# Patient Record
Sex: FEMALE | Race: WHITE | NOT HISPANIC OR LATINO | Employment: FULL TIME | URBAN - METROPOLITAN AREA
[De-identification: names, ages, dates, MRNs, and addresses within clinical notes are randomized per-mention and may not be internally consistent; named-entity substitution may affect disease eponyms.]

---

## 2018-05-18 PROBLEM — I83.893 SYMPTOMATIC VARICOSE VEINS OF BOTH LOWER EXTREMITIES: Status: ACTIVE | Noted: 2018-05-18

## 2018-05-21 ENCOUNTER — CONSULT (OUTPATIENT)
Dept: VASCULAR SURGERY | Facility: CLINIC | Age: 38
End: 2018-05-21
Payer: COMMERCIAL

## 2018-05-21 VITALS
BODY MASS INDEX: 29.02 KG/M2 | SYSTOLIC BLOOD PRESSURE: 134 MMHG | WEIGHT: 170 LBS | DIASTOLIC BLOOD PRESSURE: 68 MMHG | TEMPERATURE: 98.4 F | HEIGHT: 64 IN | HEART RATE: 72 BPM | RESPIRATION RATE: 14 BRPM

## 2018-05-21 DIAGNOSIS — I83.893 SYMPTOMATIC VARICOSE VEINS OF BOTH LOWER EXTREMITIES: Primary | ICD-10-CM

## 2018-05-21 PROCEDURE — 99203 OFFICE O/P NEW LOW 30 MIN: CPT | Performed by: NURSE PRACTITIONER

## 2018-05-21 RX ORDER — ALPRAZOLAM 0.25 MG/1
0.25 TABLET ORAL AS NEEDED
COMMUNITY
Start: 2018-01-22

## 2018-05-21 RX ORDER — SUMATRIPTAN AND NAPROXEN SODIUM 85; 500 MG/1; MG/1
TABLET, FILM COATED ORAL
COMMUNITY
Start: 2018-01-22

## 2018-05-21 RX ORDER — LISINOPRIL AND HYDROCHLOROTHIAZIDE 20; 12.5 MG/1; MG/1
1 TABLET ORAL DAILY
COMMUNITY
Start: 2018-01-22 | End: 2019-02-11

## 2018-05-21 RX ORDER — BUPROPION HYDROCHLORIDE 150 MG/1
150 TABLET ORAL EVERY MORNING
COMMUNITY
Start: 2018-01-22

## 2018-05-21 RX ORDER — DIPHENOXYLATE HYDROCHLORIDE AND ATROPINE SULFATE 2.5; .025 MG/1; MG/1
1 TABLET ORAL
COMMUNITY
End: 2019-02-11

## 2018-05-21 NOTE — PATIENT INSTRUCTIONS
Varicose Veins   AMBULATORY CARE:   Varicose veins  are veins that become large, twisted, and swollen  They are common on the back of the calves, knees, and thighs  Varicose veins are caused by valves in your veins that do not work properly  This causes blood to collect and increase pressure in the veins of your legs  The increased pressure causes your veins to stretch, get larger, swell, and twist        Common symptoms include the following: Your symptoms may be worse after you stand or sit for long periods of time  You may have any of the following:  · Blue, purple, or bulging veins in your legs     · Pain, swelling, or muscle cramps in your legs    · Feeling of fatigue or heaviness in your legs    · Cramping in your legs  Seek care immediately if:   · You have a wound that does not heal or is infected  · You have an injury that has broken your skin and caused your varicose veins to bleed  · Your leg is swollen and hard  · You notice that your legs or feet are turning blue or black  · Your leg feels warm, tender, and painful  It may look swollen and red  Contact your healthcare provider if:   · You have pain in your leg that does not go away or gets worse  · You notice sudden large bruising on your legs  · You have a rash on your leg  · Your symptoms keep you from doing your daily activities  · You have questions or concerns about your condition or care  Treatment of varicose veins  aims to decrease symptoms, improve appearance, and prevent further problems  Treatment will depend on which veins are affected and how severe your condition is  You may need procedures to treat or remove your varicose veins  For example, your healthcare provider may inject a solution or use a laser to close the varicose veins  Surgery to remove long veins may also be done  Ask your healthcare provider for more information about procedures used to treat varicose veins    Manage varicose veins:   · Do not sit or stand for long periods of time  This can cause the blood to collect in your legs and make your symptoms worse  Bend or rotate your ankles several times every hour  Walk around for a few minutes every hour to get blood moving in your legs  · Do not cross your legs when you sit  This decreases blood flow to your feet and can make your symptoms worse  · Do not wear tight clothing or shoes  Do not wear high-heeled shoes  Do not wear clothes that are tight around the waist or knees  · Maintain a healthy weight  Being overweight or obese can make your varicose veins worse  Ask your healthcare provider how much you should weigh  Ask him or her to help you create a weight loss plan if you are overweight  · Wear pressure stockings as directed  The stockings are tight and put pressure on your legs  They improve blood flow and help prevent clots  · Elevate your legs  Keep them above the level of your heart for 15 to 30 minutes several times a day  You can also prop the end of your bed up slightly to elevate your legs while you sleep  This will help blood to flow back to your heart  · Get regular exercise  Talk to your healthcare provider about the best exercise plan for you  Exercise can improve blood flow to your legs and feet  Follow up with your healthcare provider as directed:  Write down your questions so you remember to ask them during your visits  © 2017 [employer] Information is for End User's use only and may not be sold, redistributed or otherwise used for commercial purposes  All illustrations and images included in CareNotes® are the copyrighted property of [employer]  or [employer]  The above information is an  only  It is not intended as medical advice for individual conditions or treatments  Talk to your doctor, nurse or pharmacist before following any medical regimen to see if it is safe and effective for you

## 2018-05-21 NOTE — PROGRESS NOTES
Varicose Veins  Assessment/Plan    Discussion Summary  63-year-old female who presents for evaluation of varicose veins  She has bilateral lower extremity heaviness/throbbing/aching in both lower extremities with left lower extremity swelling inhibiting her daily activities  Discomfort is primarily to the left medial calf varicosity  She is a  nad stand on her feet for a prolonged period of time   -We discussed the physiology of venous disease, treatment options and indications for treatment   -Rx 20-30mmHg compression given  She will get size and fitted begin to wear daily   -Will evaluate with venous reflux study in 3 months   -Additionally she should periodically elevate her legs, exercise routinely, maintain a normal weight and use moisturizers daily to maintain skin integrity  -follow-up in the office in 3 months to review reflux study and discuss treatment options        Chief Complaint: "I have painful veins in my legs "    Patient is new to our practice and was referred by Dr Justina Trimble (PCP)  She has not had any testing  She complains of bulging painful veins in her legs for the past 10 years  She states that her legs swell and feel tired and heavy the longer she is standing on them  Left leg is worse than right leg  She does wear OTC compression stockings and elevates her legs          History of Present Illness   Juana Barnett is seen for evaluation of: [x]Varicose veins/legs  []Spider veins/legs  []Spider veins/face  []Venous stasis ulcer  []Superficial thrombophlebitis  []Other -      She complains of []none  [x]bulging veins  []dilated veins  []discolored veins         There is []no edema              []right leg edema  [x]left leg edema       []bilateral lower extremity edema     There is   []no leg pain          []right leg pain  []left leg pain         []bilateral leg pain  [x]bilateral leg pain; L>R   []bilateral leg pain; R>L     Pain is described as [x]aching []itching  []sharp                []burning  [x]throbbing         []stinging  [x]heavy                []dull  []other -      Symptoms have been ongoing for:  Ten years and progressively worsening over the past few years   There is  [x]no pertinent medical history  []history of DVT  []PE  []superficial venous thrombosis     Prior treatment includes []none  []EVLT  [x]OTC stockings  []prescription compression stockings  []vein ligation  []vein stripping  []stab phlebectomy  []sclerotherapy injections  []Other -      Current treatment includes []none  [x]compression socks  []avoiding tight clothing  [x]leg elevation  [x]rest  []exercise  []weight management  []skin care  []periodic evaluation   []Other-     Treatment has been []effective  [x]ineffective     55-year-old female presents for evaluation of bilateral lower extremity varicosities  She has had bilateral lower extremity varicose veins have been present since the age of 32 and worsened after pregnancy  She has had 2 pregnancies, children ages 11 in 8  She wore prescription compression stockings while pregnant  She currently wears over-the-counter compression  She complains of heaviness, aching, throbbing, discomfort in both legs on a daily basis, left being worse than the right  Specifically the left medial calf varicosity is painful  She has varicosities to anterior lower leg bilaterally  She notices swelling in her left ankle toward end of the day  Symptoms are worse toward the end of the day  Symptoms worse with menstrual cycle  The over-the-counter compression does help her symptoms though not completely controlled them  She is a  and stands for long hours on a regular basis  She has a family history of maternal spider veins  She denies any history of deeper superficial venous thrombosis  She did have a venous ultrasound in 2012 which was negative for DVT  Review of Systems   Constitutional: Negative      HENT: Positive for postnasal drip  Negative for congestion, facial swelling and trouble swallowing  Eyes: Negative  Respiratory: Negative  Cardiovascular: Positive for leg swelling  Negative for chest pain and palpitations  Painful veins   Gastrointestinal: Negative  Endocrine: Negative  Genitourinary: Negative  Musculoskeletal: Positive for back pain and neck pain  Negative for arthralgias  Leg pain   Skin: Negative for color change, rash and wound  Allergic/Immunologic: Negative  Neurological: Positive for headaches  Negative for dizziness, weakness and numbness  Hematological: Negative  Psychiatric/Behavioral: Negative for agitation, decreased concentration, self-injury and suicidal ideas  The patient is nervous/anxious  Depression          Objective   Physical Exam   General:  Alert oriented x3  Neurological: grossly intact  Heart:  Regular rate and rhythm  Lungs:  Clear to auscultation bilaterally  Abdomen: is soft, nontender, positive bowel sounds  Extremities:  Left medial calf truncal varicosity with mild overlying hyperpigmentation and bilateral distal anterior leg truncal varicosities  Spider telangiectasias of left distal lower leg  Trace left ankle edema  Pulses:  2+ fem/pop/DP pulses bilaterally      Current Outpatient Prescriptions   Medication Sig Dispense Refill    ALPRAZolam (XANAX) 0 25 mg tablet Take 0 25 mg by mouth Three times a day      buPROPion (WELLBUTRIN XL) 150 mg 24 hr tablet Take 150 mg by mouth      Cholecalciferol 5000 units TABS Take 5,000 Units by mouth      lisinopril-hydrochlorothiazide (PRINZIDE,ZESTORETIC) 20-12 5 MG per tablet Take 1 tablet by mouth      SUMAtriptan-naproxen (TREXIMET)  MG per tablet Take 1 tablet by mouth once as needed at start of headache  May repeat after >2 hours   Do not exceed 2 tablets in 24 hours      multivitamin (THERAGRAN) TABS Take 1 tablet by mouth       No current facility-administered medications for this visit  Allergies   Allergen Reactions    Cefaclor Hives      No past medical history on file  No past surgical history on file  Social History     Social History    Marital status: /Civil Union     Spouse name: N/A    Number of children: N/A    Years of education: N/A     Occupational History    Not on file  Social History Main Topics    Smoking status: Not on file    Smokeless tobacco: Not on file    Alcohol use Not on file    Drug use: Unknown    Sexual activity: Not on file     Other Topics Concern    Not on file     Social History Narrative    No narrative on file      No family history on file

## 2018-09-24 ENCOUNTER — HOSPITAL ENCOUNTER (OUTPATIENT)
Dept: NON INVASIVE DIAGNOSTICS | Facility: HOSPITAL | Age: 38
Discharge: HOME | End: 2018-09-24
Payer: COMMERCIAL

## 2018-09-24 DIAGNOSIS — I83.893 SYMPTOMATIC VARICOSE VEINS OF BOTH LOWER EXTREMITIES: ICD-10-CM

## 2018-09-24 PROCEDURE — 93970 EXTREMITY STUDY: CPT

## 2018-09-25 PROCEDURE — 93970 EXTREMITY STUDY: CPT | Performed by: SURGERY

## 2018-10-29 ENCOUNTER — OFFICE VISIT (OUTPATIENT)
Dept: VASCULAR SURGERY | Facility: CLINIC | Age: 38
End: 2018-10-29
Payer: COMMERCIAL

## 2018-10-29 VITALS
SYSTOLIC BLOOD PRESSURE: 142 MMHG | TEMPERATURE: 98.5 F | HEART RATE: 74 BPM | RESPIRATION RATE: 16 BRPM | WEIGHT: 171 LBS | BODY MASS INDEX: 28.49 KG/M2 | HEIGHT: 65 IN | DIASTOLIC BLOOD PRESSURE: 72 MMHG

## 2018-10-29 DIAGNOSIS — I87.2 VENOUS INSUFFICIENCY OF BOTH LOWER EXTREMITIES: Primary | ICD-10-CM

## 2018-10-29 DIAGNOSIS — I83.893 SYMPTOMATIC VARICOSE VEINS OF BOTH LOWER EXTREMITIES: ICD-10-CM

## 2018-10-29 PROBLEM — E78.5 HYPERLIPIDEMIA: Status: ACTIVE | Noted: 2017-01-10

## 2018-10-29 PROCEDURE — 99215 OFFICE O/P EST HI 40 MIN: CPT | Performed by: SURGERY

## 2018-10-29 RX ORDER — CLINDAMYCIN PHOSPHATE 900 MG/50ML
900 INJECTION INTRAVENOUS ONCE
Status: CANCELLED | OUTPATIENT
Start: 2018-10-29 | End: 2018-10-29

## 2018-10-29 NOTE — PROGRESS NOTES
Assessment/Plan:    Pt is a 44 yo F w/ hx of HTN, HLD, OCD< migraine, anxiety, MDD, presents to discuss venous disease  Venous insufficiency of both lower extremities  Symptomatic varicose veins of both lower extremities  -     Case request operating room: Left greater saphenous vein EVLT with stab phlebectomies; Standing  -     Basic metabolic panel; Future  -     CBC and Platelet; Future  -     Case request operating room: Left greater saphenous vein EVLT with stab phlebectomies  -reviewed LEVDR which shows B GSV reflux; R from ing through mid thigh and left at inguinal, mid thigh, and mid calf levels; reflux more significant on LLE; SSV and deep system are competent B  -discussed the pathophysiology of venous disease and indications for treatment; her disease is in the ideal location and matches her symptomatology; despite optimal medical management, she continues to have symptoms which are affecting her at her job and at home taking care of her kids; discussed options including continued medical management versus EVLT and stabs and patient would like to proceed with surgical intervention  -plan for Left greater saphenous vein EVLT with stab phlebectomies  -may require R side at interval treatment but will decide after first side complete  -labs only; does have HTN since pregancy but well controlled; old holter from [facility] was wnl    Other orders  -     Diet NPO; Sips with meds; Standing  -     Void on call to OR; Standing  -     Insert peripheral IV;  Standing  -     Shower/scrub; Standing  -     clindamycin (CLEOCIN) IVPB (premix) 900 mg; Infuse 50 mL (900 mg total) into a venous catheter once       Operative Scheduling Information:    Hospital:  [facility]    Physician:  Me    Surgery: Left greater saphenous vein EVLT with stab phlebectomies      Urgency:  Standard    Case Length:  Normal    Post-op Bed:  Outpatient    OR Table:  Standard    Equipment Needs:  Vascular technologist    Medication Instructions:  None    Hydration:  No      Subjective:      Patient ID: Judith Thomas is a 45 y o  female  Patient had Lin Pappas on 9/24/2018  Patient has pain in VIPUL legs L>R  She complains of tired heavy legs that 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  She has Swelling VIPUL legs L>R especially during her period  She has worn compression socks for many years  She elevates her legs as she can and exercises when she can  She has a history of HTN      HPI:    Patient presents to discuss venous disease  Patient complains of mild edema, moderate heaviness, achiness of the legs, worse at the end of the day  She also has more severe pain at the site of a large varicosity in her left leg  She has hx of 2 pregnancies (most recent 5 yrs ago) and symptoms have worsened since then  She also notes some varicosities in her groin/vaginal region and some pain with intercourse  All of her symptoms are worse during menses  Denies hx of DVT  Family hx of venous disease in mother  She has been wearing OTC compression for many years  Had some Rx compression that hadn't been worn from after her preganancy and has been wearing these ~5d/wk since last visit with [facility]  She elevates her legs during sleep and just before sleep when watching TV  She is active with her kids but doesn't exercise per se  She is trying to lose weight but goes up and down frequently  Conservative measures help her symptoms but she still has significant swelling and aching and pain at the end of the day  She works as a  and is on her feet for 8-12 hrs/day  The following portions of the patient's history were reviewed and updated as appropriate: allergies, current medications, past family history, past medical history, past social history, past surgical history and problem list     Review of Systems   Constitutional: Negative  HENT: Negative  Eyes: Negative  Respiratory: Negative      Cardiovascular: Positive for leg swelling (painful varicosities, mostly L medial around knee)  Gastrointestinal: Negative  Endocrine: Negative  Genitourinary: Negative  Musculoskeletal: Negative  Negative for gait problem  Skin: Positive for color change  Negative for wound  Allergic/Immunologic: Negative  Neurological: Negative  Hematological: Negative  Psychiatric/Behavioral: Negative  Objective:      /72 (BP Location: Right arm, Patient Position: Sitting)   Pulse 74   Temp 98 5 °F (36 9 °C)   Resp 16   Ht 5' 4 75" (1 645 m)   Wt 77 6 kg (171 lb)   BMI 28 68 kg/m²          Physical Exam   Constitutional: She is oriented to person, place, and time  She appears well-developed and well-nourished  HENT:   Head: Normocephalic and atraumatic  Eyes: Conjunctivae are normal    Neck: Normal range of motion  Neck supple  Cardiovascular: Normal rate, regular rhythm and normal heart sounds  No murmur heard  Pulses:       Radial pulses are 2+ on the right side, and 2+ on the left side  Dorsalis pedis pulses are 2+ on the right side, and 2+ on the left side  Posterior tibial pulses are 2+ on the right side, and 2+ on the left side  Pulmonary/Chest: Effort normal and breath sounds normal  No respiratory distress  She has no wheezes  Abdominal: Soft  She exhibits no distension  There is no tenderness  There is no rebound  Musculoskeletal: Normal range of motion  She exhibits edema (mild edema BLE)  Neurological: She is alert and oriented to person, place, and time  Skin: Skin is warm and dry  Large ropy varicosities; L lower leg from just above knee to mid leg running medial to anterior and RLE on the anterior surface  Mild skin changes of L ankle/lower leg with darkening  No open wounds   Psychiatric: She has a normal mood and affect  Her behavior is normal    Nursing note and vitals reviewed          Vitals:    10/29/18 1128   BP: 142/72   BP Location: Right arm   Patient Position: Sitting   Pulse: 74   Resp: 16   Temp: 98 5 °F (36 9 °C)   Weight: 77 6 kg (171 lb)   Height: 5' 4 75" (1 645 m)       Patient Active Problem List   Diagnosis    Symptomatic varicose veins of both lower extremities    Anxiety    Depression    Hyperlipidemia    Hypertension, essential    Migraine    Obsessive-compulsive disorder    Venous insufficiency of both lower extremities       Past Surgical History:   Procedure Laterality Date    MYRINGOTOMY W/ TUBES      UMBILICAL HERNIA REPAIR      WISDOM TOOTH EXTRACTION         Family History   Problem Relation Age of Onset    Stroke Father     Heart attack Father        Social History     Social History    Marital status: /Civil Union     Spouse name: N/A    Number of children: N/A    Years of education: N/A     Occupational History    Not on file  Social History Main Topics    Smoking status: Former Smoker     Packs/day: 0 25     Years: 10 00     Types: Cigarettes     Quit date: 2006    Smokeless tobacco: Never Used    Alcohol use Yes      Comment: social    Drug use: No    Sexual activity: Yes     Other Topics Concern    Not on file     Social History Narrative    No narrative on file       Allergies   Allergen Reactions    Cefaclor Hives         Current Outpatient Prescriptions:     ALPRAZolam (XANAX) 0 25 mg tablet, Take 0 25 mg by mouth Three times a day, Disp: , Rfl:     buPROPion (WELLBUTRIN XL) 150 mg 24 hr tablet, Take 150 mg by mouth, Disp: , Rfl:     Cholecalciferol 5000 units TABS, Take 5,000 Units by mouth, Disp: , Rfl:     lisinopril-hydrochlorothiazide (PRINZIDE,ZESTORETIC) 20-12 5 MG per tablet, Take 1 tablet by mouth, Disp: , Rfl:     multivitamin (THERAGRAN) TABS, Take 1 tablet by mouth, Disp: , Rfl:     SUMAtriptan-naproxen (TREXIMET)  MG per tablet, Take 1 tablet by mouth once as needed at start of headache  May repeat after >2 hours   Do not exceed 2 tablets in 24 hours, Disp: , Rfl:

## 2018-10-29 NOTE — PATIENT INSTRUCTIONS
1) Venous insufficiency  -you have leaky valves in some of the veins in your legs  -we will continue your medical management with daily compression use, leg elevation, increased activity, and weight loss  -we also discussed surgery to treat the veins including EVLT (laser ablation) and stab phlebectomies (removing the varicose veins)  -if you would like to buy compression online, look for "gradient compression" in a weight of 20-30mmHg; try either Sigvaris or Jobst which are dependable brands  There are many different websites you can find these on and generally one is having a sale    Venous Insufficiency   WHAT YOU NEED TO KNOW:   What is venous insufficiency? Venous insufficiency is a condition that prevents blood from flowing out of your legs and back to your heart  Veins contain valves that help blood flow in one direction  Venous insufficiency means the valves do not close correctly or fully  Blood flows back and pools in your leg  This can cause problems such as varicose veins  Venous insufficiency may also be called chronic venous insufficiency or venous stasis  What increases my risk for venous insufficiency? · A leg injury or blood clot    · Being a woman    · Pregnancy    · Older age    · A family history of varicose veins    · Smoking cigarettes    · Obesity, or not getting enough exercise  What are the signs and symptoms of venous insufficiency? · Visible veins on your legs that may be small and red or large, thick, and blue    · Swelling in your ankles or calves    · Changes in skin color, such as dark or purple skin    · An ulcer (open sore) on your leg    · Leg pain that is worse when you are menstruating (women) or when you stand, and better when you elevate your legs    · Burning or itching    · Cramps that happen at night    · Thick, hard skin on your legs and ankles    · Feeling of heaviness in your legs  How is venous insufficiency diagnosed?    · Venous duplex imaging  is a procedure used to examine the blood flow through veins  A gel will be applied to your legs  Your healthcare provider will slide a small device called a transducer across the veins  The transducer is a microphone that helps your healthcare provider hear blood moving through the vein  · Contrast venography  is a procedure used to show the veins on x-ray pictures  A catheter is guided into the vein  Contrast liquid is injected into the catheter to help the veins show up better in the pictures  Tell the healthcare provider if you have ever had an allergic reaction to contrast liquid  · Plethysmography  is a procedure that may be used to find changes in blood pressure through your veins  You will wear a blood pressure cuff on your leg  Changes in pressure and the amount of blood that can circulate through your leg veins are measured  Pressures are measured while you stand, sit, and lift your leg  How is venous insufficiency treated? · Medicine  may be given to improve blood flow  The medicines may thin your blood or reduce swelling to help blood flow  You may also need medicine to treat a bacterial infection  · Ablation  is a procedure used to close varicose veins  A catheter is guided until it is near the vein  A device will then be guided to the area  The device may produce energy through radiofrequency or a laser  The energy creates heat that will close the blood vessel  · Sclerotherapy  is a procedure used to fade visible veins  Your healthcare provider will inject a liquid into a spider vein or varicose vein  The liquid causes irritation in the vein  The vein swells and sticks together  Your body will then absorb the vein  · Surgery  may be needed if other treatments do not work  Surgery may be used to repair a leg vein valve or to clip or tie off a vein so blood cannot flow through it  You may need to have a veins removed during surgery called stripping   Surgery may be used to bypass (go around) the damaged vein  Blood will flow through a vein transplanted from another part of your body  What can I do to manage my symptoms? · Wear pressure stockings as directed  Pressure stockings help keep blood from pooling in your leg veins  Your healthcare provider can prescribe stockings that are right for you  Do not buy over-the-counter pressure stockings unless your healthcare provider says it is okay  They may not fit correctly or may have elastic that cuts off your circulation  Ask your healthcare provider when to start wearing pressure stockings and how long to wear them each day  · Do not sit or stand for long periods of time  If you have to sit for a long time, flex and extend your legs, feet, and ankles  Do this about 10 times every 30 minutes to help keep blood flowing  If you have to stand for a long time, take breaks and sit with your legs elevated  · Elevate your legs  Elevate your legs above the level of your heart to reduce swelling  Your healthcare provider may recommend that you keep your legs elevated for 30 minutes at a time  You may need to do this 3 to 4 times per day, or more if your healthcare provider recommends  · Do not smoke  Nicotine and other chemicals in cigarettes and cigars can cause blood vessel damage  Ask your healthcare provider for information if you currently smoke and need help to quit  E-cigarettes or smokeless tobacco still contain nicotine  Talk to your healthcare provider before you use these products  · Reach or maintain a healthy weight  Extra weight can make venous insufficiency worse  Ask your healthcare provider how much you should weigh  He can help you create a weight loss plan if you need to lose weight  · Exercise as directed  Walking can help increase blood flow in your calves  Ask your healthcare provider how much exercise you need each day and which exercises are best for you  · Care for your skin  Keep your skin clean   Do not use any soaps or lotions that may dry your skin  For example, do not use products that contain fragrance or alcohol  If you have a skin ulcer, your healthcare provider may recommend a wet-to-dry bandage  To do this, apply a wet bandage to your wound and allow it to dry  This will help remove drainage from your wound each time you change the bandage  Your healthcare provider will tell you how often to change your bandage and which kind of bandage to use  Check your wound for signs of infection, such as swelling or pus  · Go to physical therapy (PT) as directed  A physical therapist can help you increase movement and range of motion in your legs  When should I seek immediate care? · You have a wound that does not heal or is infected  · You have an injury that has broken your skin and caused your varicose veins to bleed  · Your leg is swollen and hard  · You have pain in your leg that does not go away or gets worse  · Your legs or feet are turning blue or black  · Your leg feels warm, tender, and painful  It may look swollen and red  When should I contact my healthcare provider? · You have a fever  · You have varicose veins and they are painful  · You have new or worsening leg pain, swelling, or redness  · You have new or worsening ulcers or other sores on your leg  · You have questions or concerns about your condition or care  CARE AGREEMENT:   You have the right to help plan your care  Learn about your health condition and how it may be treated  Discuss treatment options with your caregivers to decide what care you want to receive  You always have the right to refuse treatment  The above information is an  only  It is not intended as medical advice for individual conditions or treatments  Talk to your doctor, nurse or pharmacist before following any medical regimen to see if it is safe and effective for you    © 2017 [employer] Information is for End User's use only and may not be sold, redistributed or otherwise used for commercial purposes  All illustrations and images included in CareNotes® are the copyrighted property of A D [employer] M [employer]  or [employer]

## 2018-11-16 ENCOUNTER — TELEPHONE (OUTPATIENT)
Dept: VASCULAR SURGERY | Facility: CLINIC | Age: 38
End: 2018-11-16

## 2018-12-26 ENCOUNTER — TELEPHONE (OUTPATIENT)
Dept: VASCULAR SURGERY | Facility: CLINIC | Age: 38
End: 2018-12-26

## 2019-01-07 ENCOUNTER — TELEPHONE (OUTPATIENT)
Dept: VASCULAR SURGERY | Facility: CLINIC | Age: 39
End: 2019-01-07

## 2019-01-07 DIAGNOSIS — I83.893 SYMPTOMATIC VARICOSE VEINS OF BOTH LOWER EXTREMITIES: Primary | ICD-10-CM

## 2019-01-07 NOTE — TELEPHONE ENCOUNTER
Spoke to patient to schedule surgery for 2-8-19 at [facility] with LMD Patient was told to get blood work done before surgery patient was told nothing to eat or drank after midnight on 2-7-19 No medications to be held I am mailing H&P to patient to have her primary doctor to complete and faxed back to us  Patient confirmed and understood instructions   LLF

## 2019-01-21 ENCOUNTER — APPOINTMENT (OUTPATIENT)
Dept: LAB | Facility: CLINIC | Age: 39
End: 2019-01-21
Payer: COMMERCIAL

## 2019-01-21 DIAGNOSIS — I87.2 VENOUS INSUFFICIENCY OF BOTH LOWER EXTREMITIES: ICD-10-CM

## 2019-01-21 DIAGNOSIS — I83.893 SYMPTOMATIC VARICOSE VEINS OF BOTH LOWER EXTREMITIES: ICD-10-CM

## 2019-01-21 LAB
ANION GAP SERPL CALCULATED.3IONS-SCNC: 5 MMOL/L (ref 4–13)
BUN SERPL-MCNC: 10 MG/DL (ref 5–25)
CALCIUM SERPL-MCNC: 8.7 MG/DL (ref 8.3–10.1)
CHLORIDE SERPL-SCNC: 106 MMOL/L (ref 100–108)
CO2 SERPL-SCNC: 24 MMOL/L (ref 21–32)
CREAT SERPL-MCNC: 0.85 MG/DL (ref 0.6–1.3)
ERYTHROCYTE [DISTWIDTH] IN BLOOD BY AUTOMATED COUNT: 12.8 % (ref 11.6–15.1)
GFR SERPL CREATININE-BSD FRML MDRD: 87 ML/MIN/1.73SQ M
GLUCOSE SERPL-MCNC: 97 MG/DL (ref 65–140)
HCT VFR BLD AUTO: 42.7 % (ref 34.8–46.1)
HGB BLD-MCNC: 14.1 G/DL (ref 11.5–15.4)
MCH RBC QN AUTO: 30.9 PG (ref 26.8–34.3)
MCHC RBC AUTO-ENTMCNC: 33 G/DL (ref 31.4–37.4)
MCV RBC AUTO: 94 FL (ref 82–98)
PLATELET # BLD AUTO: 276 THOUSANDS/UL (ref 149–390)
PMV BLD AUTO: 11.3 FL (ref 8.9–12.7)
POTASSIUM SERPL-SCNC: 4.6 MMOL/L (ref 3.5–5.3)
RBC # BLD AUTO: 4.56 MILLION/UL (ref 3.81–5.12)
SODIUM SERPL-SCNC: 135 MMOL/L (ref 136–145)
WBC # BLD AUTO: 5.2 THOUSAND/UL (ref 4.31–10.16)

## 2019-01-21 PROCEDURE — 85027 COMPLETE CBC AUTOMATED: CPT

## 2019-01-21 PROCEDURE — 36415 COLL VENOUS BLD VENIPUNCTURE: CPT

## 2019-01-21 PROCEDURE — 80048 BASIC METABOLIC PNL TOTAL CA: CPT

## 2019-01-24 ENCOUNTER — ANESTHESIA EVENT (OUTPATIENT)
Dept: PERIOP | Facility: HOSPITAL | Age: 39
End: 2019-01-24
Payer: COMMERCIAL

## 2019-01-30 NOTE — PRE-PROCEDURE INSTRUCTIONS
Pre-Surgery Instructions:   Medication Instructions    ALPRAZolam (XANAX) 0 25 mg tablet Instructed patient per Anesthesia Guidelines   buPROPion (WELLBUTRIN XL) 150 mg 24 hr tablet Instructed patient per Anesthesia Guidelines   Cholecalciferol 5000 units TABS Patient was instructed by Physician and understands   lisinopril-hydrochlorothiazide (PRINZIDE,ZESTORETIC) 20-12 5 MG per tablet Instructed patient per Anesthesia Guidelines   multivitamin [facility]) TABS Patient was instructed by Physician and understands   SUMAtriptan-naproxen (TREXIMET)  MG per tablet Patient was instructed by Physician and understands  Pre op and bathing instructions reviewed   Pt has hibiclens

## 2019-01-31 ENCOUNTER — TELEPHONE (OUTPATIENT)
Dept: VASCULAR SURGERY | Facility: CLINIC | Age: 39
End: 2019-01-31

## 2019-01-31 NOTE — TELEPHONE ENCOUNTER
Due to a change in Dr Delong Ache scheduled we rescheduled pts EVLT surgery to 2/12/19 at [facility]  Pt is okay with this date and understand all instructions

## 2019-02-05 NOTE — PRE-PROCEDURE INSTRUCTIONS
Pre-Surgery Instructions:   Medication Instructions    ALPRAZolam (XANAX) 0 25 mg tablet epic    buPROPion (WELLBUTRIN XL) 150 mg 24 hr tablet epic    Cholecalciferol 5000 units TABS Instructed patient per Anesthesia Guidelines   lisinopril-hydrochlorothiazide (PRINZIDE,ZESTORETIC) 20-12 5 MG per tablet epic do not take dos    multivitamin (Joshua Ortega) TABS Instructed patient per Anesthesia Guidelines   SUMAtriptan-naproxen (TREXIMET)  MG per tablet Instructed patient per Anesthesia Guidelines  No NSAIDS 7 days prior to procedure  Education Index     Med Instructions Troubleshoot   Antidepressant Med Class     Continue to take this medication on your normal schedule  If this is an oral medication and you take it in the morning, then you may take this medicine with a sip of water  Benzodiazepine antagonist Med Class     If this medication is needed please continue to take on your normal schedule  If you take it in the morning, then you may take this medicine with a sip of water  Pre procedure instructions reviewed,verbalizes understanding  Advised not to smoke prior to procedure

## 2019-02-08 ENCOUNTER — ANESTHESIA (OUTPATIENT)
Dept: PERIOP | Facility: HOSPITAL | Age: 39
End: 2019-02-08
Payer: COMMERCIAL

## 2019-02-11 ENCOUNTER — HOSPITAL ENCOUNTER (EMERGENCY)
Facility: HOSPITAL | Age: 39
Discharge: HOME | End: 2019-02-11
Attending: EMERGENCY MEDICINE | Admitting: EMERGENCY MEDICINE
Payer: COMMERCIAL

## 2019-02-11 VITALS
RESPIRATION RATE: 18 BRPM | OXYGEN SATURATION: 98 % | BODY MASS INDEX: 30.76 KG/M2 | TEMPERATURE: 99 F | DIASTOLIC BLOOD PRESSURE: 77 MMHG | SYSTOLIC BLOOD PRESSURE: 124 MMHG | WEIGHT: 182 LBS | HEART RATE: 91 BPM

## 2019-02-11 DIAGNOSIS — G43.909 MIGRAINE: Primary | ICD-10-CM

## 2019-02-11 LAB — EXT PREG TEST URINE: NEGATIVE

## 2019-02-11 PROCEDURE — 99283 EMERGENCY DEPT VISIT LOW MDM: CPT

## 2019-02-11 PROCEDURE — 81025 URINE PREGNANCY TEST: CPT | Performed by: EMERGENCY MEDICINE

## 2019-02-11 PROCEDURE — 96361 HYDRATE IV INFUSION ADD-ON: CPT

## 2019-02-11 PROCEDURE — 96374 THER/PROPH/DIAG INJ IV PUSH: CPT

## 2019-02-11 PROCEDURE — 96375 TX/PRO/DX INJ NEW DRUG ADDON: CPT

## 2019-02-11 RX ORDER — BUTALBITAL, ACETAMINOPHEN AND CAFFEINE 50; 325; 40 MG/1; MG/1; MG/1
1 TABLET ORAL EVERY 4 HOURS PRN
Qty: 2 TABLET | Refills: 0 | Status: SHIPPED | OUTPATIENT
Start: 2019-02-11

## 2019-02-11 RX ORDER — METOCLOPRAMIDE HYDROCHLORIDE 5 MG/ML
10 INJECTION INTRAMUSCULAR; INTRAVENOUS ONCE
Status: COMPLETED | OUTPATIENT
Start: 2019-02-11 | End: 2019-02-11

## 2019-02-11 RX ORDER — DIPHENHYDRAMINE HYDROCHLORIDE 50 MG/ML
50 INJECTION INTRAMUSCULAR; INTRAVENOUS ONCE
Status: COMPLETED | OUTPATIENT
Start: 2019-02-11 | End: 2019-02-11

## 2019-02-11 RX ORDER — BUTALBITAL, ACETAMINOPHEN AND CAFFEINE 50; 325; 40 MG/1; MG/1; MG/1
1 TABLET ORAL ONCE
Status: COMPLETED | OUTPATIENT
Start: 2019-02-11 | End: 2019-02-11

## 2019-02-11 RX ADMIN — METOCLOPRAMIDE 10 MG: 5 INJECTION, SOLUTION INTRAMUSCULAR; INTRAVENOUS at 12:10

## 2019-02-11 RX ADMIN — BUTALBITAL, ACETAMINOPHEN, AND CAFFEINE 1 TABLET: 50; 325; 40 TABLET ORAL at 12:08

## 2019-02-11 RX ADMIN — DIPHENHYDRAMINE HYDROCHLORIDE 50 MG: 50 INJECTION INTRAMUSCULAR; INTRAVENOUS at 12:08

## 2019-02-11 RX ADMIN — SODIUM CHLORIDE 500 ML: 0.9 INJECTION, SOLUTION INTRAVENOUS at 12:08

## 2019-02-11 NOTE — H&P (VIEW-ONLY)
History  Chief Complaint   Patient presents with    Migraine     Pt reports migraine since saturday  Pt reports not able to take home medications due to needing a vascular surgery tomorrow  per pt tylenol is giving her no relief  Pt states the pain is on the right side of her head and feels as if it it squeezing her eye  44 yo female with h/o migraine, for which she is prescribed abortive medication, and responds well to NSAIDs, c/o onset of HA over the last 2 days, without fever, chills, or any particularly different components, only that she is scheduled for vascular surgery tomorrow and was advised not to use her migraine medication or any NSAIDs due to bleeding risk  She has been trying tylenol without much help  She said usually toradol works well if she needs to seek care in an urgent care, but she knows that is also an NSAID  History provided by:  Patient  Headache   Pain location:  Generalized  Quality:  Dull  Onset quality:  Gradual  Duration:  2 days  Timing:  Constant  Progression:  Waxing and waning  Chronicity:  Recurrent  Similar to prior headaches: yes    Context: bright light and loud noise    Relieved by:  Nothing  Worsened by:  Light  Ineffective treatments:  Acetaminophen  Associated symptoms: nausea and photophobia    Associated symptoms: no abdominal pain, no cough, no diarrhea, no dizziness, no fever, no neck pain, no neck stiffness, no numbness, no sore throat, no vomiting and no weakness        Prior to Admission Medications   Prescriptions Last Dose Informant Patient Reported? Taking? ALPRAZolam (XANAX) 0 25 mg tablet More than a month at Unknown time Self Yes No   Sig: Take 0 25 mg by mouth as needed     Cholecalciferol 5000 units TABS  Self Yes Yes   Sig: Take 5,000 Units by mouth   SUMAtriptan-naproxen (TREXIMET)  MG per tablet  Self Yes Yes   Sig: Take 1 tablet by mouth once as needed at start of headache  May repeat after >2 hours   Do not exceed 2 tablets in 24 hours   buPROPion (WELLBUTRIN XL) 150 mg 24 hr tablet  Self Yes Yes   Sig: Take 150 mg by mouth every morning     lisinopril-hydrochlorothiazide (PRINZIDE,ZESTORETIC) 20-12 5 MG per tablet  Self Yes Yes   Sig: Take 1 tablet by mouth daily        Facility-Administered Medications: None       Past Medical History:   Diagnosis Date    Anxiety     OCD    GERD (gastroesophageal reflux disease)     HTN (hypertension)     Hyperlipidemia     Migraines     Palpitations     Post partum depression        Past Surgical History:   Procedure Laterality Date    MYRINGOTOMY W/ TUBES      UMBILICAL HERNIA REPAIR      WISDOM TOOTH EXTRACTION         Family History   Problem Relation Age of Onset    Stroke Father     Heart attack Father      I have reviewed and agree with the history as documented  Social History     Tobacco Use    Smoking status: Former Smoker     Packs/day: 0 25     Years: 10 00     Pack years: 2 50     Types: Cigarettes     Last attempt to quit:      Years since quittin 1    Smokeless tobacco: Never Used    Tobacco comment: advised not to smoke prior to procedure   Substance Use Topics    Alcohol use: Yes     Comment: rarely    Drug use: Yes     Types: Marijuana     Comment: rarely        Review of Systems   Constitutional: Negative for appetite change, chills and fever  HENT: Negative for sore throat  Eyes: Positive for photophobia  Respiratory: Negative for cough, shortness of breath and wheezing  Cardiovascular: Negative for chest pain and palpitations  Gastrointestinal: Positive for nausea  Negative for abdominal pain, diarrhea and vomiting  Genitourinary: Negative for dysuria and hematuria  Musculoskeletal: Negative for neck pain and neck stiffness  Skin: Negative for rash  Neurological: Positive for headaches  Negative for dizziness, weakness and numbness  Psychiatric/Behavioral: Negative for suicidal ideas     All other systems reviewed and are negative  Physical Exam  Physical Exam   Constitutional: She is oriented to person, place, and time  Vital signs are normal  She appears well-developed and well-nourished  Non-toxic appearance  Emotionally distressed and anxious due to her headache, but oriented, nontoxic, no focal deficits, redirectable and able to stop crying to provide history and complete examination   HENT:   Head: Normocephalic and atraumatic  Right Ear: Tympanic membrane and external ear normal    Left Ear: Tympanic membrane and external ear normal    Nose: Nose normal    Mouth/Throat: Oropharynx is clear and moist    Eyes: Pupils are equal, round, and reactive to light  Conjunctivae and EOM are normal    Neck: Normal range of motion and full passive range of motion without pain  Neck supple  No Brudzinski's sign and no Kernig's sign noted  Cardiovascular: Normal rate, regular rhythm, normal heart sounds, intact distal pulses and normal pulses  No murmur heard  Pulmonary/Chest: Effort normal and breath sounds normal  No tachypnea  No respiratory distress  She has no wheezes  Abdominal: Soft  Bowel sounds are normal  She exhibits no distension  There is no tenderness  There is no rigidity, no rebound and no guarding  Musculoskeletal: Normal range of motion  Right lower leg: She exhibits no swelling  Left lower leg: She exhibits no swelling  Lymphadenopathy:     She has no cervical adenopathy  Neurological: She is alert and oriented to person, place, and time  She has normal strength and normal reflexes  No cranial nerve deficit or sensory deficit  Coordination and gait normal  GCS eye subscore is 4  GCS verbal subscore is 5  GCS motor subscore is 6  Skin: Skin is warm and dry  Capillary refill takes less than 2 seconds  No rash noted  She is not diaphoretic  No pallor  Psychiatric: She has a normal mood and affect   Her speech is normal and behavior is normal  Judgment and thought content normal  Cognition and memory are normal    Nursing note and vitals reviewed        Vital Signs  ED Triage Vitals   Temperature Pulse Respirations Blood Pressure SpO2   02/11/19 1145 02/11/19 1145 02/11/19 1145 02/11/19 1145 02/11/19 1145   99 °F (37 2 °C) (!) 120 18 (!) 151/112 97 %      Temp Source Heart Rate Source Patient Position - Orthostatic VS BP Location FiO2 (%)   02/11/19 1145 02/11/19 1145 -- 02/11/19 1145 --   Temporal Monitor  Right arm       Pain Score       02/11/19 1157       Worst Possible Pain           Vitals:    02/11/19 1145 02/11/19 1249   BP: (!) 151/112 124/77   Pulse: (!) 120 91       Visual Acuity      ED Medications  Medications   metoclopramide (REGLAN) injection 10 mg (10 mg Intravenous Given 2/11/19 1210)   diphenhydrAMINE (BENADRYL) injection 50 mg (50 mg Intravenous Given 2/11/19 1208)   sodium chloride 0 9 % bolus 500 mL (0 mL Intravenous Stopped 2/11/19 1242)   butalbital-acetaminophen-caffeine (FIORICET,ESGIC) -40 mg per tablet 1 tablet (1 tablet Oral Given 2/11/19 1208)       Diagnostic Studies  Results Reviewed     Procedure Component Value Units Date/Time    POCT pregnancy, urine [[id number]]  (Normal) Resulted:  02/11/19 1157    Lab Status:  Final result Updated:  02/11/19 1157     EXT PREG TEST UR (Ref: Negative) NEGATIVE                 No orders to display              Procedures  Procedures       Phone Contacts  ED Phone Contact    ED Course  ED Course as of Feb 11 1326   Mon Feb 11, 2019   1248 Headache is resolving and she feels to be discharged                                  MDM    Disposition  Final diagnoses:   Migraine     Time reflects when diagnosis was documented in both MDM as applicable and the Disposition within this note     Time User Action Codes Description Comment    2/11/2019 12:49 PM Sigifredo Alarcon Add [G43 909] Migraine       ED Disposition     ED Disposition Condition Date/Time Comment    Discharge Good Mon Feb 11, 2019 12:49 PM Ozzy Gubler discharge to home/self care  Follow-up Information     Follow up With Specialties Details Why Contact Info    Glory Cai MD Family Medicine Go to  As needed [address] [place]  [place] [id number]  [phone]            Discharge Medication List as of 2/11/2019 12:55 PM      START taking these medications    Details   butalbital-acetaminophen-caffeine (FIORICET,ESGIC) -40 mg per tablet Take 1 tablet by mouth every 4 (four) hours as needed for headaches, Starting Mon 2/11/2019, Print         CONTINUE these medications which have NOT CHANGED    Details   buPROPion (WELLBUTRIN XL) 150 mg 24 hr tablet Take 150 mg by mouth every morning  , Starting Mon 1/22/2018, Historical Med      Cholecalciferol 5000 units TABS Take 5,000 Units by mouth, Starting Mon 1/22/2018, Historical Med      lisinopril-hydrochlorothiazide (PRINZIDE,ZESTORETIC) 20-12 5 MG per tablet Take 1 tablet by mouth daily  , Starting Mon 1/22/2018, Until Mon 2/11/2019, Historical Med      SUMAtriptan-naproxen (TREXIMET)  MG per tablet Take 1 tablet by mouth once as needed at start of headache  May repeat after >2 hours  Do not exceed 2 tablets in 24 hours, Historical Med      ALPRAZolam (XANAX) 0 25 mg tablet Take 0 25 mg by mouth as needed  , Starting Mon 1/22/2018, Historical Med           No discharge procedures on file      ED Provider  Electronically Signed by           Ashli Flores MD  02/11/19 206

## 2019-02-11 NOTE — DISCHARGE INSTRUCTIONS
Migraine Headache   WHAT YOU NEED TO KNOW:   A migraine is a severe headache  The pain can be so severe that it interferes with your daily activities  A migraine can last a few hours up to several days  The exact cause of migraines is not known  DISCHARGE INSTRUCTIONS:   Return to the emergency department if:   You have a headache that seems different or much worse than your usual migraine headache  You have a severe headache with a fever or a stiff neck  You have new problems with speech, vision, balance, or movement  You feel like you are going to faint, you become confused, or you have a seizure  Contact your healthcare provider or neurologist if:   Your migraines interfere with your daily activities  Your medicines or treatments stop working  You have questions or concerns about your condition or care

## 2019-02-11 NOTE — ED PROVIDER NOTES
History  Chief Complaint   Patient presents with    Migraine     Pt reports migraine since saturday  Pt reports not able to take home medications due to needing a vascular surgery tomorrow  per pt tylenol is giving her no relief  Pt states the pain is on the right side of her head and feels as if it it squeezing her eye  46 yo female with h/o migraine, for which she is prescribed abortive medication, and responds well to NSAIDs, c/o onset of HA over the last 2 days, without fever, chills, or any particularly different components, only that she is scheduled for vascular surgery tomorrow and was advised not to use her migraine medication or any NSAIDs due to bleeding risk  She has been trying tylenol without much help  She said usually toradol works well if she needs to seek care in an urgent care, but she knows that is also an NSAID  History provided by:  Patient  Headache   Pain location:  Generalized  Quality:  Dull  Onset quality:  Gradual  Duration:  2 days  Timing:  Constant  Progression:  Waxing and waning  Chronicity:  Recurrent  Similar to prior headaches: yes    Context: bright light and loud noise    Relieved by:  Nothing  Worsened by:  Light  Ineffective treatments:  Acetaminophen  Associated symptoms: nausea and photophobia    Associated symptoms: no abdominal pain, no cough, no diarrhea, no dizziness, no fever, no neck pain, no neck stiffness, no numbness, no sore throat, no vomiting and no weakness        Prior to Admission Medications   Prescriptions Last Dose Informant Patient Reported? Taking? ALPRAZolam (XANAX) 0 25 mg tablet More than a month at Unknown time Self Yes No   Sig: Take 0 25 mg by mouth as needed     Cholecalciferol 5000 units TABS  Self Yes Yes   Sig: Take 5,000 Units by mouth   SUMAtriptan-naproxen (TREXIMET)  MG per tablet  Self Yes Yes   Sig: Take 1 tablet by mouth once as needed at start of headache  May repeat after >2 hours   Do not exceed 2 tablets in 24 hours   buPROPion (WELLBUTRIN XL) 150 mg 24 hr tablet  Self Yes Yes   Sig: Take 150 mg by mouth every morning     lisinopril-hydrochlorothiazide (PRINZIDE,ZESTORETIC) 20-12 5 MG per tablet  Self Yes Yes   Sig: Take 1 tablet by mouth daily        Facility-Administered Medications: None       Past Medical History:   Diagnosis Date    Anxiety     OCD    GERD (gastroesophageal reflux disease)     HTN (hypertension)     Hyperlipidemia     Migraines     Palpitations     Post partum depression        Past Surgical History:   Procedure Laterality Date    MYRINGOTOMY W/ TUBES      UMBILICAL HERNIA REPAIR      WISDOM TOOTH EXTRACTION         Family History   Problem Relation Age of Onset    Stroke Father     Heart attack Father      I have reviewed and agree with the history as documented  Social History     Tobacco Use    Smoking status: Former Smoker     Packs/day: 0 25     Years: 10 00     Pack years: 2 50     Types: Cigarettes     Last attempt to quit:      Years since quittin 1    Smokeless tobacco: Never Used    Tobacco comment: advised not to smoke prior to procedure   Substance Use Topics    Alcohol use: Yes     Comment: rarely    Drug use: Yes     Types: Marijuana     Comment: rarely        Review of Systems   Constitutional: Negative for appetite change, chills and fever  HENT: Negative for sore throat  Eyes: Positive for photophobia  Respiratory: Negative for cough, shortness of breath and wheezing  Cardiovascular: Negative for chest pain and palpitations  Gastrointestinal: Positive for nausea  Negative for abdominal pain, diarrhea and vomiting  Genitourinary: Negative for dysuria and hematuria  Musculoskeletal: Negative for neck pain and neck stiffness  Skin: Negative for rash  Neurological: Positive for headaches  Negative for dizziness, weakness and numbness  Psychiatric/Behavioral: Negative for suicidal ideas     All other systems reviewed and are negative  Physical Exam  Physical Exam   Constitutional: She is oriented to person, place, and time  Vital signs are normal  She appears well-developed and well-nourished  Non-toxic appearance  Emotionally distressed and anxious due to her headache, but oriented, nontoxic, no focal deficits, redirectable and able to stop crying to provide history and complete examination   HENT:   Head: Normocephalic and atraumatic  Right Ear: Tympanic membrane and external ear normal    Left Ear: Tympanic membrane and external ear normal    Nose: Nose normal    Mouth/Throat: Oropharynx is clear and moist    Eyes: Pupils are equal, round, and reactive to light  Conjunctivae and EOM are normal    Neck: Normal range of motion and full passive range of motion without pain  Neck supple  No Brudzinski's sign and no Kernig's sign noted  Cardiovascular: Normal rate, regular rhythm, normal heart sounds, intact distal pulses and normal pulses  No murmur heard  Pulmonary/Chest: Effort normal and breath sounds normal  No tachypnea  No respiratory distress  She has no wheezes  Abdominal: Soft  Bowel sounds are normal  She exhibits no distension  There is no tenderness  There is no rigidity, no rebound and no guarding  Musculoskeletal: Normal range of motion  Right lower leg: She exhibits no swelling  Left lower leg: She exhibits no swelling  Lymphadenopathy:     She has no cervical adenopathy  Neurological: She is alert and oriented to person, place, and time  She has normal strength and normal reflexes  No cranial nerve deficit or sensory deficit  Coordination and gait normal  GCS eye subscore is 4  GCS verbal subscore is 5  GCS motor subscore is 6  Skin: Skin is warm and dry  Capillary refill takes less than 2 seconds  No rash noted  She is not diaphoretic  No pallor  Psychiatric: She has a normal mood and affect   Her speech is normal and behavior is normal  Judgment and thought content normal  Cognition and memory are normal    Nursing note and vitals reviewed        Vital Signs  ED Triage Vitals   Temperature Pulse Respirations Blood Pressure SpO2   02/11/19 1145 02/11/19 1145 02/11/19 1145 02/11/19 1145 02/11/19 1145   99 °F (37 2 °C) (!) 120 18 (!) 151/112 97 %      Temp Source Heart Rate Source Patient Position - Orthostatic VS BP Location FiO2 (%)   02/11/19 1145 02/11/19 1145 -- 02/11/19 1145 --   Temporal Monitor  Right arm       Pain Score       02/11/19 1157       Worst Possible Pain           Vitals:    02/11/19 1145 02/11/19 1249   BP: (!) 151/112 124/77   Pulse: (!) 120 91       Visual Acuity      ED Medications  Medications   metoclopramide (REGLAN) injection 10 mg (10 mg Intravenous Given 2/11/19 1210)   diphenhydrAMINE (BENADRYL) injection 50 mg (50 mg Intravenous Given 2/11/19 1208)   sodium chloride 0 9 % bolus 500 mL (0 mL Intravenous Stopped 2/11/19 1242)   butalbital-acetaminophen-caffeine (FIORICET,ESGIC) -40 mg per tablet 1 tablet (1 tablet Oral Given 2/11/19 1208)       Diagnostic Studies  Results Reviewed     Procedure Component Value Units Date/Time    POCT pregnancy, urine [[id number]]  (Normal) Resulted:  02/11/19 1157    Lab Status:  Final result Updated:  02/11/19 1157     EXT PREG TEST UR (Ref: Negative) NEGATIVE                 No orders to display              Procedures  Procedures       Phone Contacts  ED Phone Contact    ED Course  ED Course as of Feb 11 1326   Mon Feb 11, 2019   1248 Headache is resolving and she feels to be discharged                                  MDM    Disposition  Final diagnoses:   Migraine     Time reflects when diagnosis was documented in both MDM as applicable and the Disposition within this note     Time User Action Codes Description Comment    2/11/2019 12:49 PM Ollei Howard Add [G43 909] Migraine       ED Disposition     ED Disposition Condition Date/Time Comment    Discharge Good Mon Feb 11, 2019 12:49 PM [place] discharge to home/self care  Follow-up Information     Follow up With Specialties Details Why Contact Info    Ludmila Baptiste MD Family Medicine Go to  As needed [address] [facility]  [place] [address]  [phone]            Discharge Medication List as of 2/11/2019 12:55 PM      START taking these medications    Details   butalbital-acetaminophen-caffeine (FIORICET,ESGIC) -40 mg per tablet Take 1 tablet by mouth every 4 (four) hours as needed for headaches, Starting Mon 2/11/2019, Print         CONTINUE these medications which have NOT CHANGED    Details   buPROPion (WELLBUTRIN XL) 150 mg 24 hr tablet Take 150 mg by mouth every morning  , Starting Mon 1/22/2018, Historical Med      Cholecalciferol 5000 units TABS Take 5,000 Units by mouth, Starting Mon 1/22/2018, Historical Med      lisinopril-hydrochlorothiazide (PRINZIDE,ZESTORETIC) 20-12 5 MG per tablet Take 1 tablet by mouth daily  , Starting Mon 1/22/2018, Until Mon 2/11/2019, Historical Med      SUMAtriptan-naproxen (TREXIMET)  MG per tablet Take 1 tablet by mouth once as needed at start of headache  May repeat after >2 hours  Do not exceed 2 tablets in 24 hours, Historical Med      ALPRAZolam (XANAX) 0 25 mg tablet Take 0 25 mg by mouth as needed  , Starting Mon 1/22/2018, Historical Med           No discharge procedures on file      ED Provider  Electronically Signed by           Geoffrey Juarez MD  02/11/19 5873

## 2019-02-12 ENCOUNTER — HOSPITAL ENCOUNTER (OUTPATIENT)
Facility: HOSPITAL | Age: 39
Setting detail: OUTPATIENT SURGERY
Discharge: HOME | End: 2019-02-12
Attending: SURGERY | Admitting: SURGERY
Payer: COMMERCIAL

## 2019-02-12 ENCOUNTER — APPOINTMENT (OUTPATIENT)
Dept: NON INVASIVE DIAGNOSTICS | Facility: HOSPITAL | Age: 39
End: 2019-02-12
Payer: COMMERCIAL

## 2019-02-12 VITALS
HEIGHT: 65 IN | BODY MASS INDEX: 30.32 KG/M2 | RESPIRATION RATE: 16 BRPM | WEIGHT: 182 LBS | TEMPERATURE: 99 F | OXYGEN SATURATION: 96 % | DIASTOLIC BLOOD PRESSURE: 79 MMHG | HEART RATE: 106 BPM | SYSTOLIC BLOOD PRESSURE: 141 MMHG

## 2019-02-12 DIAGNOSIS — I87.2 VENOUS INSUFFICIENCY OF BOTH LOWER EXTREMITIES: Primary | ICD-10-CM

## 2019-02-12 DIAGNOSIS — I83.892 VARICOSE VEINS OF LEFT LOWER EXTREMITY WITH COMPLICATIONS: ICD-10-CM

## 2019-02-12 LAB — EXT PREGNANCY TEST URINE: NEGATIVE

## 2019-02-12 PROCEDURE — 81025 URINE PREGNANCY TEST: CPT | Performed by: SURGERY

## 2019-02-12 PROCEDURE — 93971 EXTREMITY STUDY: CPT

## 2019-02-12 PROCEDURE — 36478 ENDOVENOUS LASER 1ST VEIN: CPT | Performed by: SURGERY

## 2019-02-12 PROCEDURE — 37765 STAB PHLEB VEINS XTR 10-20: CPT | Performed by: SURGERY

## 2019-02-12 PROCEDURE — 99024 POSTOP FOLLOW-UP VISIT: CPT | Performed by: SURGERY

## 2019-02-12 RX ORDER — OXYCODONE HYDROCHLORIDE AND ACETAMINOPHEN 5; 325 MG/1; MG/1
1 TABLET ORAL EVERY 4 HOURS PRN
Qty: 20 TABLET | Refills: 0 | Status: SHIPPED | OUTPATIENT
Start: 2019-02-12 | End: 2019-02-22

## 2019-02-12 RX ORDER — FENTANYL CITRATE 50 UG/ML
INJECTION, SOLUTION INTRAMUSCULAR; INTRAVENOUS AS NEEDED
Status: DISCONTINUED | OUTPATIENT
Start: 2019-02-12 | End: 2019-02-12 | Stop reason: SURG

## 2019-02-12 RX ORDER — SODIUM CHLORIDE, SODIUM LACTATE, POTASSIUM CHLORIDE, CALCIUM CHLORIDE 600; 310; 30; 20 MG/100ML; MG/100ML; MG/100ML; MG/100ML
INJECTION, SOLUTION INTRAVENOUS CONTINUOUS PRN
Status: DISCONTINUED | OUTPATIENT
Start: 2019-02-12 | End: 2019-02-12 | Stop reason: SURG

## 2019-02-12 RX ORDER — MIDAZOLAM HYDROCHLORIDE 1 MG/ML
INJECTION INTRAMUSCULAR; INTRAVENOUS AS NEEDED
Status: DISCONTINUED | OUTPATIENT
Start: 2019-02-12 | End: 2019-02-12 | Stop reason: SURG

## 2019-02-12 RX ORDER — LIDOCAINE HYDROCHLORIDE 10 MG/ML
INJECTION, SOLUTION INFILTRATION; PERINEURAL AS NEEDED
Status: DISCONTINUED | OUTPATIENT
Start: 2019-02-12 | End: 2019-02-12 | Stop reason: SURG

## 2019-02-12 RX ORDER — OXYCODONE HYDROCHLORIDE AND ACETAMINOPHEN 5; 325 MG/1; MG/1
1 TABLET ORAL EVERY 4 HOURS PRN
Status: DISCONTINUED | OUTPATIENT
Start: 2019-02-12 | End: 2019-02-12 | Stop reason: HOSPADM

## 2019-02-12 RX ORDER — FENTANYL CITRATE/PF 50 MCG/ML
50 SYRINGE (ML) INJECTION
Status: DISCONTINUED | OUTPATIENT
Start: 2019-02-12 | End: 2019-02-12 | Stop reason: HOSPADM

## 2019-02-12 RX ORDER — ONDANSETRON 2 MG/ML
INJECTION INTRAMUSCULAR; INTRAVENOUS AS NEEDED
Status: DISCONTINUED | OUTPATIENT
Start: 2019-02-12 | End: 2019-02-12 | Stop reason: SURG

## 2019-02-12 RX ORDER — ALBUTEROL SULFATE 2.5 MG/3ML
2.5 SOLUTION RESPIRATORY (INHALATION) ONCE AS NEEDED
Status: DISCONTINUED | OUTPATIENT
Start: 2019-02-12 | End: 2019-02-12 | Stop reason: HOSPADM

## 2019-02-12 RX ORDER — MEPERIDINE HYDROCHLORIDE 25 MG/ML
12.5 INJECTION INTRAMUSCULAR; INTRAVENOUS; SUBCUTANEOUS ONCE AS NEEDED
Status: DISCONTINUED | OUTPATIENT
Start: 2019-02-12 | End: 2019-02-12 | Stop reason: HOSPADM

## 2019-02-12 RX ORDER — SODIUM CHLORIDE, SODIUM LACTATE, POTASSIUM CHLORIDE, CALCIUM CHLORIDE 600; 310; 30; 20 MG/100ML; MG/100ML; MG/100ML; MG/100ML
100 INJECTION, SOLUTION INTRAVENOUS CONTINUOUS
Status: DISCONTINUED | OUTPATIENT
Start: 2019-02-12 | End: 2019-02-12 | Stop reason: HOSPADM

## 2019-02-12 RX ORDER — ONDANSETRON 2 MG/ML
4 INJECTION INTRAMUSCULAR; INTRAVENOUS ONCE AS NEEDED
Status: DISCONTINUED | OUTPATIENT
Start: 2019-02-12 | End: 2019-02-12 | Stop reason: HOSPADM

## 2019-02-12 RX ORDER — PROPOFOL 10 MG/ML
INJECTION, EMULSION INTRAVENOUS AS NEEDED
Status: DISCONTINUED | OUTPATIENT
Start: 2019-02-12 | End: 2019-02-12 | Stop reason: SURG

## 2019-02-12 RX ORDER — CLINDAMYCIN PHOSPHATE 900 MG/50ML
900 INJECTION INTRAVENOUS ONCE
Status: COMPLETED | OUTPATIENT
Start: 2019-02-12 | End: 2019-02-12

## 2019-02-12 RX ORDER — EPHEDRINE SULFATE 50 MG/ML
INJECTION, SOLUTION INTRAVENOUS AS NEEDED
Status: DISCONTINUED | OUTPATIENT
Start: 2019-02-12 | End: 2019-02-12 | Stop reason: SURG

## 2019-02-12 RX ADMIN — SODIUM CHLORIDE, SODIUM LACTATE, POTASSIUM CHLORIDE, AND CALCIUM CHLORIDE: .6; .31; .03; .02 INJECTION, SOLUTION INTRAVENOUS at 07:20

## 2019-02-12 RX ADMIN — FENTANYL CITRATE 50 MCG: 50 INJECTION, SOLUTION INTRAMUSCULAR; INTRAVENOUS at 10:27

## 2019-02-12 RX ADMIN — EPHEDRINE SULFATE 10 MG: 50 INJECTION, SOLUTION INTRAMUSCULAR; INTRAVENOUS; SUBCUTANEOUS at 09:12

## 2019-02-12 RX ADMIN — FENTANYL CITRATE 75 MCG: 50 INJECTION, SOLUTION INTRAMUSCULAR; INTRAVENOUS at 09:42

## 2019-02-12 RX ADMIN — FENTANYL CITRATE 50 MCG: 50 INJECTION, SOLUTION INTRAMUSCULAR; INTRAVENOUS at 10:13

## 2019-02-12 RX ADMIN — MIDAZOLAM 2 MG: 1 INJECTION INTRAMUSCULAR; INTRAVENOUS at 07:47

## 2019-02-12 RX ADMIN — FENTANYL CITRATE 50 MCG: 50 INJECTION, SOLUTION INTRAMUSCULAR; INTRAVENOUS at 07:57

## 2019-02-12 RX ADMIN — FENTANYL CITRATE 25 MCG: 50 INJECTION, SOLUTION INTRAMUSCULAR; INTRAVENOUS at 07:50

## 2019-02-12 RX ADMIN — EPHEDRINE SULFATE 5 MG: 50 INJECTION, SOLUTION INTRAMUSCULAR; INTRAVENOUS; SUBCUTANEOUS at 09:21

## 2019-02-12 RX ADMIN — FENTANYL CITRATE 25 MCG: 50 INJECTION, SOLUTION INTRAMUSCULAR; INTRAVENOUS at 08:15

## 2019-02-12 RX ADMIN — PROPOFOL 50 MG: 10 INJECTION, EMULSION INTRAVENOUS at 08:13

## 2019-02-12 RX ADMIN — OXYCODONE HYDROCHLORIDE AND ACETAMINOPHEN 1 TABLET: 5; 325 TABLET ORAL at 11:06

## 2019-02-12 RX ADMIN — PROPOFOL 150 MG: 10 INJECTION, EMULSION INTRAVENOUS at 07:50

## 2019-02-12 RX ADMIN — SODIUM CHLORIDE, SODIUM LACTATE, POTASSIUM CHLORIDE, AND CALCIUM CHLORIDE: .6; .31; .03; .02 INJECTION, SOLUTION INTRAVENOUS at 09:00

## 2019-02-12 RX ADMIN — CLINDAMYCIN PHOSPHATE 900 MG: 900 INJECTION, SOLUTION INTRAVENOUS at 07:48

## 2019-02-12 RX ADMIN — DEXAMETHASONE SODIUM PHOSPHATE 8 MG: 10 INJECTION INTRAMUSCULAR; INTRAVENOUS at 07:58

## 2019-02-12 RX ADMIN — LIDOCAINE HYDROCHLORIDE 50 MG: 10 INJECTION, SOLUTION INFILTRATION; PERINEURAL at 07:50

## 2019-02-12 RX ADMIN — PROPOFOL 50 MG: 10 INJECTION, EMULSION INTRAVENOUS at 07:54

## 2019-02-12 RX ADMIN — FENTANYL CITRATE 25 MCG: 50 INJECTION, SOLUTION INTRAMUSCULAR; INTRAVENOUS at 07:54

## 2019-02-12 RX ADMIN — ONDANSETRON 4 MG: 2 INJECTION INTRAMUSCULAR; INTRAVENOUS at 07:58

## 2019-02-12 NOTE — ANESTHESIA POSTPROCEDURE EVALUATION
Post-Op Assessment Note    CV Status:  Stable  Pain Score: 1    Pain management: adequate     Mental Status:  Alert and awake   Hydration Status:  Euvolemic   PONV Controlled:  Controlled   Airway Patency:  Patent   Post Op Vitals Reviewed: Yes      Staff: AnesthesiologistARLETTE           BP   110/68   Temp (P) 99 °F (37 2 °C) (02/12/19 0945)    Pulse (!) (P) 116 (02/12/19 0945)   Resp (P) 19 (02/12/19 0945)    SpO2   98%

## 2019-02-12 NOTE — OP NOTE
OPERATIVE REPORT  PATIENT NAME: Carley Childress    :  1980  MRN: [id number]  Pt Location: BE OR ROOM 07    SURGERY DATE: 2019    Surgeon(s) and Role:     Miguel Mao MD - Primary    Preop Diagnosis:  Venous insufficiency of both lower extremities [I87 2]  Symptomatic varicose veins of both lower extremities [I83 893]    Post-Op Diagnosis Codes: * Venous insufficiency of both lower extremities [I87 2]     * Symptomatic varicose veins of both lower extremities [I83 893]    Procedure(s) (LRB):  Left GREATER SAPHENOUS VEIN EVLT WITH STAB PHLEBECTOMIES x 16    Specimen(s):  none    Estimated Blood Loss:   50cc    Drains:  none    Anesthesia Type:   General LMA    Operative Indications:  Patient is a 44 yo F w/ venous insufficiency and painful varicosities with edema, not controlled by medical management alone, presents for surgical intervention    Operative Findings:  Large left greater saphenous vein which gives off two large branches in the thigh and remains within the fascia throughout the thigh and knee segments    Complications:   None    Procedure and Technique:  After informed consent was obtained, the patient was brought to the operating room and placed in the supine position  She was given anesthesia and an LMA was placed  She was given IV antibiotics with clindamycin  The patient was then prepped and draped in the usual sterile fashion, exposing the left leg circumferentially  The duplex ultrasound was brought to the field and the greater saphenous vein was mapped from the groin to the knee  The vein was then accessed with the micropuncture set and the guidewire exchanged for the  035 J-wire which passed easily into the femoral vein  The laser sheath was inserted over the guidewire and the laser fiber was then positioned 2 8 cm from the saphenofemoral junction  Tumescent anesthesia was then instilled under ultrasound guidance along the entire length of the greater saphenous vein   The laser was then activated at 13 crane and withdrawn in one continuous pullback and pressure was held at the access site  Following this, insonation of the saphenofemoral junction confirmed patency and flow through the junction and no evidence of deep vein thrombosis  The greater saphenous vein showed wall thickening with little to no flow indicating successful ablation  We then turned our attention to the phlebectomy portion of the procedure  Varicosities were marked prior to the procedure with the patient in a standing position  Small stab incisions were made over the varicosities with an 11-blade and a vein hook and mosquito clamps were used to avulse these veins  A total of 16 incisions were made  Hemostasis was achieved with compression at each puncture site  The access site and skin nicks were dressed with histoacryl glue and the leg was wrapped with gauze, kerlix, and coban from toes to thigh  The patient was allowed to awaken and was extubated  She was transferred to the PACU for postoperative care       I was present for the entire procedure    Patient Disposition:  PACU     SIGNATURE: Sherlyn Mao MD  DATE: February 12, 2019  TIME: 9:50 AM

## 2019-02-12 NOTE — DISCHARGE INSTRUCTIONS
DISCHARGE INSTRUCTIONS        VARICOSE VEIN SURGERY    1) When released from the hospital, you should have a compression bandage in place on the operated leg  This bandage should feel snug but not too tight  If the bandage becomes blood soaked or painfully tight, elevate your leg and call your surgeon immediately  2) If the operated leg becomes increasingly painful or swollen, or if there is increasing redness or pain around your incision, contact our office  3) For the first day after your operation elevate your leg as much as possible  You are encouraged to walk at least 20 minutes the first day  When sitting, the leg should be elevated  The preferred position is to have the leg at or above the level of the heart  Starting on the second post-op day, walking is strongly encouraged as tolerated  4) Some bruising of the skin is common after varicose vein surgery  This can be lessened by strict elevation of the leg  Many patients will notice some numbness of the shin, ankle, calf, or the top of the foot  This usually fades with time, but may be persistent  After surgery you can expect bruising, swelling and hard knots on your leg  As your body heals the bruising will fade and the swelling and knots will subside  5) Observe incisions daily  Report to our office any of the following:  a) Any areas that are red and angry in appearance  b) Any drainage that is milky or cloudy in appearance or that has a foul odor  c) Elevated temperature of 100 5 degrees F or greater  6) Apply sunscreen with SPF 30 to incisions while sun bathing for up to one year after surgery to reduce the chances of your incisions darkening  7) Your first post-operative appointment will be 2 to 3 days after your surgery  At this appointment, your bandages will be removed and the scheduled office surgeon, not necessarily the surgeon that did your surgery, will see you               8)         If have any questions, please call our office at ([phone])  [address]  [phone] [facility] FREE [phone]  [address] [facility] , [address] [facility], [place] [facility] [place], [address]  [address], [place], [address]  [address], [facility], [address]  [address] [facility], [facility], [address]  [place], [address], [address]  [facility], [address] [facility], [address] [facility], [place], [address] [facility] [address]   [address] [facility], [address] [facility], [place] [facility] [place], [address]  [address] [facility], [address] [facility], [facility], [place] [address]

## 2019-02-12 NOTE — ANESTHESIA PREPROCEDURE EVALUATION
Review of Systems/Medical History          Cardiovascular  Hyperlipidemia, Hypertension ,    Pulmonary       GI/Hepatic    GERD ,             Endo/Other     GYN       Hematology   Musculoskeletal       Neurology    Headaches,    Psychology   Anxiety, Depression ,                   Anesthesia Plan  ASA Score- 2     Anesthesia Type- general with ASA Monitors  Additional Monitors:   Airway Plan: LMA  Comment: General anesthesia, LMA; standard ASA monitors  Risks and benefits discussed with patient; patient consented and agrees to proceed  I saw and evaluated the patient  If seen with CRNA, we have discussed the anesthetic plan and I am in agreement that the plan is appropriate for the patient  upt neg 2/12/2019  Plan Factors-    Induction- intravenous  Postoperative Plan- Plan for postoperative opioid use  Planned trial extubation    Informed Consent- Anesthetic plan and risks discussed with patient  I personally reviewed this patient with the CRNA  Discussed and agreed on the Anesthesia Plan with the ARLETTE Sousa

## 2019-02-12 NOTE — INTERVAL H&P NOTE
H&P reviewed  After examining the patient I find no changes in the patients condition since the H&P had been written      To OR for Left greater saphenous vein EVLT with stab phlebectomies

## 2019-02-13 NOTE — ASSESSMENT & PLAN NOTE
Bilateral venous insufficiency, now status post left EVLT and stab phlebectomies by Dr Lior Leo Doctor 2/12/19     -will see the patient in 4-6 weeks for reassessment and discuss intervention on right lower extremity   -see plan above

## 2019-02-13 NOTE — ASSESSMENT & PLAN NOTE
Symptomatic varicose veins bilateral lower extremities now s/p left GSV EVLT with stab phlebectomies time 16 by Dr Sherlyn Ryan Doctor  02/12/2019    -44 yo F w/ hx of HTN, HLD, OCD< migraine, anxiety, MDD, bilateral lower extremity venous insufficiency and symptomatic varicose veins L>R  -dressing removed at today's visit  Slight erythema to medial thigh  Stab incisions with glue, well approximated, no erythema, no drainage  PLAN:  -Continue to observe incisions daily  If you notice open areas, drainage, pus, redness, or develop a fever, please call our office for evaluation  -Venous duplex to assess for EHIT following EVLT  -Continue elevation throughout the day as able  -Wear compression stockings daily when able to do so comfortably  -Continue to exercise daily as tolerated; recommend working up to at least 30 minutes of walking per day  -Continue to follow a low sodium diet  -F/u with Vascular Surgeon in 4-6 weeks for re-assessment of leg  -discussed with Dr Sherlyn Ryan, follow-up with any provider in 4-6 weeks

## 2019-02-14 ENCOUNTER — OFFICE VISIT (OUTPATIENT)
Dept: VASCULAR SURGERY | Facility: CLINIC | Age: 39
End: 2019-02-14

## 2019-02-14 VITALS
WEIGHT: 180 LBS | HEIGHT: 64 IN | BODY MASS INDEX: 30.73 KG/M2 | DIASTOLIC BLOOD PRESSURE: 92 MMHG | SYSTOLIC BLOOD PRESSURE: 148 MMHG | TEMPERATURE: 98.9 F

## 2019-02-14 DIAGNOSIS — I83.893 SYMPTOMATIC VARICOSE VEINS OF BOTH LOWER EXTREMITIES: Primary | ICD-10-CM

## 2019-02-14 DIAGNOSIS — I87.2 VENOUS INSUFFICIENCY OF BOTH LOWER EXTREMITIES: ICD-10-CM

## 2019-02-14 PROCEDURE — 99024 POSTOP FOLLOW-UP VISIT: CPT | Performed by: NURSE PRACTITIONER

## 2019-02-14 NOTE — PROGRESS NOTES
Assessment/Plan:    Symptomatic varicose veins of both lower extremities  Symptomatic varicose veins bilateral lower extremities now s/p left GSV EVLT with stab phlebectomies time 16 by Dr Jamir Miller Doctor  02/12/2019    -46 yo F w/ hx of HTN, HLD, OCD< migraine, anxiety, MDD, bilateral lower extremity venous insufficiency and symptomatic varicose veins L>R  -dressing removed at today's visit  Slight erythema to medial thigh  Stab incisions with glue, well approximated, no erythema, no drainage  PLAN:  -Continue to observe incisions daily  If you notice open areas, drainage, pus, redness, or develop a fever, please call our office for evaluation  -Venous duplex to assess for EHIT following EVLT  -Continue elevation throughout the day as able  -Wear compression stockings daily when able to do so comfortably  -Continue to exercise daily as tolerated; recommend working up to at least 30 minutes of walking per day  -Continue to follow a low sodium diet  -F/u with Vascular Surgeon in 4-6 weeks for re-assessment of leg  -discussed with Dr Jamir Miller, follow-up with any provider in 4-6 weeks  Venous insufficiency of both lower extremities  Bilateral venous insufficiency, now status post left EVLT and stab phlebectomies by Dr Jamir Miller Doctor 2/12/19     -will see the patient in 4-6 weeks for reassessment and discuss intervention on right lower extremity   -see plan above           Diagnoses and all orders for this visit:    Symptomatic varicose veins of both lower extremities    Venous insufficiency of both lower extremities               Patient ID: Genie Ansari is a 45 y o  female  Chief complaint: Pt is here post op left leg EVLT done by Dr Jamir Miller on 2/12/19  Pt is here to remove dressing  Buddy Morton is a 46 yo F w/ hx of HTN, HLD, OCD< migraine, anxiety, MDD, bilateral lower extremity venous insufficiency and symptomatic varicose veins L>R who is now s/p Left EVLT and stab phlebectomies by Dr Jamir Miller on 2/12/19    She she has kept her OR dressing on  She did have some discomfort but after the dressing was removed at the visit reports some relief  She denies any fever, chills, chest pain, shortness of breath  She denies any significant pain to the left lower extremity  She has been ambulating without difficulty  She denies any significant drainage or bleeding from incision sites  She continues to wear compression on the right lower extremity  The following portions of the patient's history were reviewed and updated as appropriate: allergies, current medications, past family history, past medical history, past social history, past surgical history and problem list     Review of Systems   Constitutional: Negative  Negative for chills and fever  HENT: Negative  Eyes: Negative  Respiratory: Negative  Negative for shortness of breath  Cardiovascular: Positive for leg swelling  Negative for chest pain  Gastrointestinal: Negative  Endocrine: Negative  Genitourinary: Negative  Musculoskeletal: Negative  Skin: Positive for color change  Negative for wound  Allergic/Immunologic: Negative  Neurological: Negative  Hematological: Negative  Psychiatric/Behavioral: The patient is nervous/anxious  ROS reviewed and updated  Objective:      /92 (BP Location: Right arm, Patient Position: Sitting, Cuff Size: Adult)   Temp 98 9 °F (37 2 °C) (Tympanic)   Ht 5' 4" (1 626 m)   Wt 81 6 kg (180 lb) Comment: per pt  BMI 30 90 kg/m²          Physical Exam   Constitutional: She is oriented to person, place, and time  She appears well-developed and well-nourished  HENT:   Head: Normocephalic and atraumatic  Eyes: Pupils are equal, round, and reactive to light  EOM are normal  No scleral icterus  Neck: Normal range of motion  Cardiovascular: Normal rate, regular rhythm, normal heart sounds and intact distal pulses     Pulmonary/Chest: Effort normal and breath sounds normal  No respiratory distress  Abdominal: Soft  Bowel sounds are normal    Musculoskeletal: Normal range of motion  Neurological: She is alert and oriented to person, place, and time  Skin: Skin is warm and dry  Stab sites to LLE are c/d/i with glue, minimal swelling, no open areas or drainage  There is some redness/erythema to the Left medial thigh, no palpable chord or associated warmth  Minimal tenderness to light palpation  Psychiatric: She has a normal mood and affect  Her behavior is normal  Judgment and thought content normal    Nursing note and vitals reviewed  I have reviewed and made appropriate changes to the review of systems input by the medical assistant      Vitals:    02/14/19 1005   BP: 148/92   BP Location: Right arm   Patient Position: Sitting   Cuff Size: Adult   Temp: 98 9 °F (37 2 °C)   TempSrc: Tympanic   Weight: 81 6 kg (180 lb)   Height: 5' 4" (1 626 m)       Patient Active Problem List   Diagnosis    Symptomatic varicose veins of both lower extremities    Anxiety    Depression    Hyperlipidemia    Hypertension, essential    Migraine    Obsessive-compulsive disorder    Venous insufficiency of both lower extremities       Past Surgical History:   Procedure Laterality Date    MYRINGOTOMY W/ TUBES      NH ENDOVENOUS LASER, 1ST VEIN Left 2/12/2019    Procedure: GREATER SAPHENOUS VEIN EVLT WITH 16 STAB PHLEBECTOMIES;  Surgeon: Shayy Mao MD;  Location: [facility];  Service: Vascular    UMBILICAL HERNIA REPAIR      WISDOM TOOTH EXTRACTION         Family History   Problem Relation Age of Onset    Stroke Father     Heart attack Father        Social History     Socioeconomic History    Marital status: /Civil Union     Spouse name: Not on file    Number of children: Not on file    Years of education: Not on file    Highest education level: Not on file   Occupational History    Not on file   Social Needs    Financial resource strain: Not on file    Food insecurity: Worry: Not on file     Inability: Not on file    Transportation needs:     Medical: Not on file     Non-medical: Not on file   Tobacco Use    Smoking status: Former Smoker     Packs/day: 0 25     Years: 10 00     Pack years: 2 50     Types: Cigarettes     Last attempt to quit: 2006     Years since quittin 1    Smokeless tobacco: Never Used    Tobacco comment: advised not to smoke prior to procedure   Substance and Sexual Activity    Alcohol use: Yes     Comment: rarely    Drug use: Yes     Types: Marijuana     Comment: rarely    Sexual activity: Yes   Lifestyle    Physical activity:     Days per week: Not on file     Minutes per session: Not on file    Stress: Not on file   Relationships    Social connections:     Talks on phone: Not on file     Gets together: Not on file     Attends Christianity service: Not on file     Active member of club or organization: Not on file     Attends meetings of clubs or organizations: Not on file     Relationship status: Not on file    Intimate partner violence:     Fear of current or ex partner: Not on file     Emotionally abused: Not on file     Physically abused: Not on file     Forced sexual activity: Not on file   Other Topics Concern    Not on file   Social History Narrative    Not on file       Allergies   Allergen Reactions    Cefaclor Hives         Current Outpatient Medications:     ALPRAZolam (XANAX) 0 25 mg tablet, Take 0 25 mg by mouth as needed  , Disp: , Rfl:     buPROPion (WELLBUTRIN XL) 150 mg 24 hr tablet, Take 150 mg by mouth every morning  , Disp: , Rfl:     butalbital-acetaminophen-caffeine (FIORICET,ESGIC) -40 mg per tablet, Take 1 tablet by mouth every 4 (four) hours as needed for headaches, Disp: 2 tablet, Rfl: 0    Cholecalciferol 5000 units TABS, Take 5,000 Units by mouth, Disp: , Rfl:     oxyCODONE-acetaminophen (PERCOCET) 5-325 mg per tablet, Take 1 tablet by mouth every 4 (four) hours as needed for moderate pain for up to 10 daysMax Daily Amount: 6 tablets, Disp: 20 tablet, Rfl: 0    SUMAtriptan-naproxen (TREXIMET)  MG per tablet, Take 1 tablet by mouth once as needed at start of headache  May repeat after >2 hours   Do not exceed 2 tablets in 24 hours, Disp: , Rfl:     lisinopril-hydrochlorothiazide (PRINZIDE,ZESTORETIC) 20-12 5 MG per tablet, Take 1 tablet by mouth daily  , Disp: , Rfl:

## 2019-02-14 NOTE — LETTER
February 14, 2019     Easton Barlow MD  [facility] [phone]    Patient: Damián Euceda   YOB: 1980   Date of Visit: 2/14/2019       Dear Dr Milner Self:    Thank you for referring Damián Euceda to me for evaluation  Below are my notes for this consultation  If you have questions, please do not hesitate to call me  I look forward to following your patient along with you           Sincerely,        ZANE Hinojosa        CC: No Recipients

## 2019-02-14 NOTE — PATIENT INSTRUCTIONS
PLAN:  -Continue to observe incisions daily  If you notice open areas, drainage, pus, redness, or develop a fever, please call our office for evaluation  -Venous duplex to assess for EHIT following EVLT  -Continue elevation throughout the day as able  -Wear compression stockings daily when able to do so comfortably  -Continue to exercise daily as tolerated; recommend working up to at least 30 minutes of walking per day  -Continue to follow a low sodium diet  -F/u with Vascular Surgeon in 4-6 weeks for re-assessment of leg        Varicose Veins   AMBULATORY CARE:   Varicose veins  are veins that become large, twisted, and swollen  They are common on the back of the calves, knees, and thighs  Varicose veins are caused by valves in your veins that do not work properly  This causes blood to collect and increase pressure in the veins of your legs  The increased pressure causes your veins to stretch, get larger, swell, and twist        Common symptoms include the following: Your symptoms may be worse after you stand or sit for long periods of time  You may have any of the following:  · Blue, purple, or bulging veins in your legs     · Pain, swelling, or muscle cramps in your legs    · Feeling of fatigue or heaviness in your legs    · Cramping in your legs  Seek care immediately if:   · You have a wound that does not heal or is infected  · You have an injury that has broken your skin and caused your varicose veins to bleed  · Your leg is swollen and hard  · You notice that your legs or feet are turning blue or black  · Your leg feels warm, tender, and painful  It may look swollen and red  Contact your healthcare provider if:   · You have pain in your leg that does not go away or gets worse  · You notice sudden large bruising on your legs  · You have a rash on your leg  · Your symptoms keep you from doing your daily activities      · You have questions or concerns about your condition or care   Treatment of varicose veins  aims to decrease symptoms, improve appearance, and prevent further problems  Treatment will depend on which veins are affected and how severe your condition is  You may need procedures to treat or remove your varicose veins  For example, your healthcare provider may inject a solution or use a laser to close the varicose veins  Surgery to remove long veins may also be done  Ask your healthcare provider for more information about procedures used to treat varicose veins  Manage varicose veins:   · Do not sit or stand for long periods of time  This can cause the blood to collect in your legs and make your symptoms worse  Bend or rotate your ankles several times every hour  Walk around for a few minutes every hour to get blood moving in your legs  · Do not cross your legs when you sit  This decreases blood flow to your feet and can make your symptoms worse  · Do not wear tight clothing or shoes  Do not wear high-heeled shoes  Do not wear clothes that are tight around the waist or knees  · Maintain a healthy weight  Being overweight or obese can make your varicose veins worse  Ask your healthcare provider how much you should weigh  Ask him or her to help you create a weight loss plan if you are overweight  · Wear pressure stockings as directed  The stockings are tight and put pressure on your legs  They improve blood flow and help prevent clots  · Elevate your legs  Keep them above the level of your heart for 15 to 30 minutes several times a day  You can also prop the end of your bed up slightly to elevate your legs while you sleep  This will help blood to flow back to your heart  · Get regular exercise  Talk to your healthcare provider about the best exercise plan for you  Exercise can improve blood flow to your legs and feet    Follow up with your healthcare provider as directed:  Write down your questions so you remember to ask them during your visits  © 2017 [facility] Information is for End User's use only and may not be sold, redistributed or otherwise used for commercial purposes  All illustrations and images included in CareNotes® are the copyrighted property of [employer]  or [employer]  The above information is an  only  It is not intended as medical advice for individual conditions or treatments  Talk to your doctor, nurse or pharmacist before following any medical regimen to see if it is safe and effective for you

## 2019-02-18 ENCOUNTER — HOSPITAL ENCOUNTER (OUTPATIENT)
Dept: NON INVASIVE DIAGNOSTICS | Facility: CLINIC | Age: 39
Discharge: HOME | End: 2019-02-18
Payer: COMMERCIAL

## 2019-02-18 DIAGNOSIS — I83.893 SYMPTOMATIC VARICOSE VEINS OF BOTH LOWER EXTREMITIES: ICD-10-CM

## 2019-02-18 PROCEDURE — 93971 EXTREMITY STUDY: CPT | Performed by: SURGERY

## 2019-02-18 PROCEDURE — 93971 EXTREMITY STUDY: CPT

## 2019-03-21 ENCOUNTER — OFFICE VISIT (OUTPATIENT)
Dept: VASCULAR SURGERY | Facility: CLINIC | Age: 39
End: 2019-03-21

## 2019-03-21 VITALS
SYSTOLIC BLOOD PRESSURE: 146 MMHG | BODY MASS INDEX: 30.9 KG/M2 | DIASTOLIC BLOOD PRESSURE: 88 MMHG | HEIGHT: 64 IN | TEMPERATURE: 99.1 F

## 2019-03-21 DIAGNOSIS — I83.893 SYMPTOMATIC VARICOSE VEINS OF BOTH LOWER EXTREMITIES: Primary | ICD-10-CM

## 2019-03-21 PROCEDURE — 99024 POSTOP FOLLOW-UP VISIT: CPT | Performed by: NURSE PRACTITIONER

## 2019-03-21 NOTE — PATIENT INSTRUCTIONS
Continue to wear compression on a daily basis  Call if we can be of further assistance or if anything changes with the lump on the calf  Varicose Veins   AMBULATORY CARE:   Varicose veins  are veins that become large, twisted, and swollen  They are common on the back of the calves, knees, and thighs  Varicose veins are caused by valves in your veins that do not work properly  This causes blood to collect and increase pressure in the veins of your legs  The increased pressure causes your veins to stretch, get larger, swell, and twist        Common symptoms include the following: Your symptoms may be worse after you stand or sit for long periods of time  You may have any of the following:  · Blue, purple, or bulging veins in your legs     · Pain, swelling, or muscle cramps in your legs    · Feeling of fatigue or heaviness in your legs    · Cramping in your legs  Seek care immediately if:   · You have a wound that does not heal or is infected  · You have an injury that has broken your skin and caused your varicose veins to bleed  · Your leg is swollen and hard  · You notice that your legs or feet are turning blue or black  · Your leg feels warm, tender, and painful  It may look swollen and red  Contact your healthcare provider if:   · You have pain in your leg that does not go away or gets worse  · You notice sudden large bruising on your legs  · You have a rash on your leg  · Your symptoms keep you from doing your daily activities  · You have questions or concerns about your condition or care  Treatment of varicose veins  aims to decrease symptoms, improve appearance, and prevent further problems  Treatment will depend on which veins are affected and how severe your condition is  You may need procedures to treat or remove your varicose veins  For example, your healthcare provider may inject a solution or use a laser to close the varicose veins   Surgery to remove long veins may also be done  Ask your healthcare provider for more information about procedures used to treat varicose veins  Manage varicose veins:   · Do not sit or stand for long periods of time  This can cause the blood to collect in your legs and make your symptoms worse  Bend or rotate your ankles several times every hour  Walk around for a few minutes every hour to get blood moving in your legs  · Do not cross your legs when you sit  This decreases blood flow to your feet and can make your symptoms worse  · Do not wear tight clothing or shoes  Do not wear high-heeled shoes  Do not wear clothes that are tight around the waist or knees  · Maintain a healthy weight  Being overweight or obese can make your varicose veins worse  Ask your healthcare provider how much you should weigh  Ask him or her to help you create a weight loss plan if you are overweight  · Wear pressure stockings as directed  The stockings are tight and put pressure on your legs  They improve blood flow and help prevent clots  · Elevate your legs  Keep them above the level of your heart for 15 to 30 minutes several times a day  You can also prop the end of your bed up slightly to elevate your legs while you sleep  This will help blood to flow back to your heart  · Get regular exercise  Talk to your healthcare provider about the best exercise plan for you  Exercise can improve blood flow to your legs and feet  Follow up with your healthcare provider as directed:  Write down your questions so you remember to ask them during your visits  © 2017 [employer] Information is for End User's use only and may not be sold, redistributed or otherwise used for commercial purposes  All illustrations and images included in CareNotes® are the copyrighted property of [employer]  or [employer]  The above information is an  only   It is not intended as medical advice for individual conditions or treatments  Talk to your doctor, nurse or pharmacist before following any medical regimen to see if it is safe and effective for you

## 2019-03-21 NOTE — ASSESSMENT & PLAN NOTE
Symptomatic varicose veins bilateral lower extremities now s/p left GSV EVLT with stab phlebectomies time 16 by Dr Nancy Wright Doctor  02/12/2019     -46 yo F w/ hx of HTN, HLD, OCD, migraine, anxiety, MDD, bilateral lower extremity venous insufficiency and symptomatic varicose veins L>R    Patient reports left leg feels much better, she is not experiencing any swelling  She has a small palpable lump to the medial calf at one of the puncture sites  It is non-tender, no redness noted; likely a superficial clot  Reccommended warm compress to area and if any changes to lump to call our office for evaluation  She does not have any significant symptoms to the right leg at this time  Continue compression stockings daily  Elevate the legs throughout the day as able  Continue daily exercise and maintaining a healthy weight  If right leg worsens, she will call the office for further evaluation  F/U PRN

## 2019-03-21 NOTE — PROGRESS NOTES
Assessment/Plan:    Symptomatic varicose veins of both lower extremities  Symptomatic varicose veins bilateral lower extremities now s/p left GSV EVLT with stab phlebectomies time 16 by Dr Hector Aguilar Doctor  02/12/2019     -46 yo F w/ hx of HTN, HLD, OCD, migraine, anxiety, MDD, bilateral lower extremity venous insufficiency and symptomatic varicose veins L>R    Patient reports left leg feels much better, she is not experiencing any swelling  She has a small palpable lump to the medial calf at one of the puncture sites  It is non-tender, no redness noted; likely a superficial clot  Reccommended warm compress to area and if any changes to lump to call our office for evaluation  She does not have any significant symptoms to the right leg at this time  Continue compression stockings daily  Elevate the legs throughout the day as able  Continue daily exercise and maintaining a healthy weight  If right leg worsens, she will call the office for further evaluation  F/U PRN  Diagnoses and all orders for this visit:    Symptomatic varicose veins of both lower extremities          Subjective:      Patient ID: Millie Neal is a 45 y o  female  Chief complaint: 5 Week Recheck L EVLT with Phlebs 2/12/19  Pt denies pain or swelling to left leg  Pt states she is wearing knee high compression  Pt does c/o lump to left leg  Kannan Khan is a 46 yo F w/ hx of HTN, HLD, OCD, migraine, anxiety, MDD, bilateral lower extremity venous insufficiency and symptomatic varicose veins L>R  She is s/p left GSV EVLT with stab phlebectomies time 16 by Dr Hector Aguilar Doctor  02/12/2019 and returns today for follow-up visit  She has a small lump to the medial calf at one of the phlebectomy sites  She denies any pain or redness to this area  Denies any open stab sites or incisions with drainage  She continues to wear prescribed compression stockings on a daily basis  She is exercising regularly    She elevates when she is able to but works on her feet as a hairdresser  She denies any significant symptoms to the right leg varicose veins today  The following portions of the patient's history were reviewed and updated as appropriate: allergies, current medications, past family history, past medical history, past social history, past surgical history and problem list     Review of Systems   Constitutional: Negative  HENT: Negative  Eyes: Negative  Respiratory: Negative  Cardiovascular: Negative  Gastrointestinal: Negative  Endocrine: Negative  Genitourinary: Negative  Musculoskeletal: Negative  Skin: Negative  Lump to the L calf     Allergic/Immunologic: Negative  Neurological: Negative  Hematological: Negative  Psychiatric/Behavioral: Negative  Objective:      /88 (BP Location: Right arm, Patient Position: Sitting, Cuff Size: Adult)   Temp 99 1 °F (37 3 °C) (Tympanic)   Ht 5' 4" (1 626 m)   BMI 30 90 kg/m²          Physical Exam   Constitutional: She is oriented to person, place, and time  She appears well-developed and well-nourished  HENT:   Head: Normocephalic and atraumatic  Eyes: Pupils are equal, round, and reactive to light  EOM are normal  No scleral icterus  Neck: Normal range of motion  Cardiovascular: Normal rate, regular rhythm and normal heart sounds  Pulmonary/Chest: Effort normal and breath sounds normal    Abdominal: Soft  Bowel sounds are normal    Musculoskeletal: Normal range of motion  Neurological: She is alert and oriented to person, place, and time  She has normal strength  Skin: Skin is warm and dry  Stab incisions are healed with slight scarring   Psychiatric: She has a normal mood and affect  Her speech is normal and behavior is normal  Judgment and thought content normal  Cognition and memory are normal    Nursing note and vitals reviewed          I have reviewed and made appropriate changes to the review of systems input by the medical assistant      Vitals:    19 0909   BP: 146/88   BP Location: Right arm   Patient Position: Sitting   Cuff Size: Adult   Temp: 99 1 °F (37 3 °C)   TempSrc: Tympanic   Height: 5' 4" (1 626 m)       Patient Active Problem List   Diagnosis    Symptomatic varicose veins of both lower extremities    Anxiety    Depression    Hyperlipidemia    Hypertension, essential    Migraine    Obsessive-compulsive disorder    Venous insufficiency of both lower extremities       Past Surgical History:   Procedure Laterality Date    MYRINGOTOMY W/ TUBES      OR ENDOVENOUS LASER, 1ST VEIN Left 2019    Procedure: GREATER SAPHENOUS VEIN EVLT WITH 16 STAB PHLEBECTOMIES;  Surgeon: Joan Mao MD;  Location: [facility];  Service: Vascular    UMBILICAL HERNIA REPAIR      WISDOM TOOTH EXTRACTION         Family History   Problem Relation Age of Onset    Stroke Father     Heart attack Father        Social History     Socioeconomic History    Marital status: /Civil Union     Spouse name: Not on file    Number of children: Not on file    Years of education: Not on file    Highest education level: Not on file   Occupational History    Not on file   Social Needs    Financial resource strain: Not on file    Food insecurity:     Worry: Not on file     Inability: Not on file    Transportation needs:     Medical: Not on file     Non-medical: Not on file   Tobacco Use    Smoking status: Former Smoker     Packs/day: 0 25     Years: 10 00     Pack years: 2 50     Types: Cigarettes     Last attempt to quit: 2006     Years since quittin 2    Smokeless tobacco: Never Used    Tobacco comment: advised not to smoke prior to procedure   Substance and Sexual Activity    Alcohol use: Yes     Comment: rarely    Drug use: Yes     Types: Marijuana     Comment: rarely    Sexual activity: Yes   Lifestyle    Physical activity:     Days per week: Not on file     Minutes per session: Not on file    Stress: Not on file   Relationships    Social connections:     Talks on phone: Not on file     Gets together: Not on file     Attends [employer] service: Not on file     Active member of club or organization: Not on file     Attends meetings of clubs or organizations: Not on file     Relationship status: Not on file    Intimate partner violence:     Fear of current or ex partner: Not on file     Emotionally abused: Not on file     Physically abused: Not on file     Forced sexual activity: Not on file   Other Topics Concern    Not on file   Social History Narrative    Not on file       Allergies   Allergen Reactions    Cefaclor Hives         Current Outpatient Medications:     ALPRAZolam (XANAX) 0 25 mg tablet, Take 0 25 mg by mouth as needed  , Disp: , Rfl:     buPROPion (WELLBUTRIN XL) 150 mg 24 hr tablet, Take 150 mg by mouth every morning  , Disp: , Rfl:     butalbital-acetaminophen-caffeine (FIORICET,ESGIC) -40 mg per tablet, Take 1 tablet by mouth every 4 (four) hours as needed for headaches, Disp: 2 tablet, Rfl: 0    Cholecalciferol 5000 units TABS, Take 5,000 Units by mouth, Disp: , Rfl:     SUMAtriptan-naproxen (TREXIMET)  MG per tablet, Take 1 tablet by mouth once as needed at start of headache  May repeat after >2 hours   Do not exceed 2 tablets in 24 hours, Disp: , Rfl:     lisinopril-hydrochlorothiazide (PRINZIDE,ZESTORETIC) 20-12 5 MG per tablet, Take 1 tablet by mouth daily  , Disp: , Rfl:

## 2021-03-10 DIAGNOSIS — Z23 ENCOUNTER FOR IMMUNIZATION: ICD-10-CM

## 2022-10-14 NOTE — TELEPHONE ENCOUNTER
LMOM for pt to call back to schedule surgery with Dr Orville Ortiz Doctor 
YOANAOM for pt to call back to schedule surgery with Dr Nika Izquierdo Doctor 
Opt out

## (undated) DEVICE — ULTRASOUND GEL STERILE FOIL PK

## (undated) DEVICE — DRAPE SHEET X-LG

## (undated) DEVICE — ACE WRAP 4 IN STERILE

## (undated) DEVICE — NEEDLE SPINAL 20G X 3.5 LF

## (undated) DEVICE — GAUZE SPONGES,16 PLY: Brand: CURITY

## (undated) DEVICE — INTENDED FOR TISSUE SEPARATION, AND OTHER PROCEDURES THAT REQUIRE A SHARP SURGICAL BLADE TO PUNCTURE OR CUT.: Brand: BARD-PARKER SAFETY BLADES SIZE 11, STERILE

## (undated) DEVICE — 1200CC GUARDIAN II: Brand: GUARDIAN

## (undated) DEVICE — TONGUE DEPRESSOR STERILE

## (undated) DEVICE — 3000CC GUARDIAN II: Brand: GUARDIAN

## (undated) DEVICE — COBAN 4 IN STERILE

## (undated) DEVICE — DRAPE SHEET THREE QUARTER

## (undated) DEVICE — GLOVE INDICATOR PI UNDERGLOVE SZ 6.5 BLUE

## (undated) DEVICE — SYRINGE 20ML LL

## (undated) DEVICE — COVER PROBE INTRAOPERATIVE 6 X 96 IN

## (undated) DEVICE — FIBER PROC KIT GOLD TIP 21G 45CM NEVERTOUCH

## (undated) DEVICE — STOPCOCK 3-WAY

## (undated) DEVICE — KERLIX BANDAGE ROLL: Brand: KERLIX

## (undated) DEVICE — ACE WRAP 6 IN STERILE

## (undated) DEVICE — GLOVE SRG BIOGEL ECLIPSE 6

## (undated) DEVICE — TIBURON SPLIT SHEET: Brand: CONVERTORS

## (undated) DEVICE — INTENDED FOR TISSUE SEPARATION, AND OTHER PROCEDURES THAT REQUIRE A SHARP SURGICAL BLADE TO PUNCTURE OR CUT.: Brand: BARD-PARKER ® CARBON RIB-BACK BLADES

## (undated) DEVICE — BETHLEHEM UNIVERSAL MINOR GEN: Brand: CARDINAL HEALTH

## (undated) DEVICE — CHLORAPREP HI-LITE 26ML ORANGE

## (undated) DEVICE — IV SET 15 DROP STERILE 0/Y GRAVITY

## (undated) DEVICE — ADHESIVE SKN CLSR HISTOACRYL FLEX 0.5ML LF

## (undated) DEVICE — Device: Brand: MEDEX